# Patient Record
Sex: MALE | Race: WHITE | NOT HISPANIC OR LATINO | Employment: OTHER | URBAN - METROPOLITAN AREA
[De-identification: names, ages, dates, MRNs, and addresses within clinical notes are randomized per-mention and may not be internally consistent; named-entity substitution may affect disease eponyms.]

---

## 2017-01-06 ENCOUNTER — TRANSFERRED RECORDS (OUTPATIENT)
Dept: HEALTH INFORMATION MANAGEMENT | Facility: CLINIC | Age: 37
End: 2017-01-06

## 2017-03-22 ENCOUNTER — TRANSFERRED RECORDS (OUTPATIENT)
Dept: HEALTH INFORMATION MANAGEMENT | Facility: CLINIC | Age: 37
End: 2017-03-22

## 2017-05-09 ENCOUNTER — OFFICE VISIT (OUTPATIENT)
Dept: PEDIATRICS | Facility: CLINIC | Age: 37
End: 2017-05-09
Payer: COMMERCIAL

## 2017-05-09 VITALS
OXYGEN SATURATION: 96 % | WEIGHT: 301.9 LBS | TEMPERATURE: 97.8 F | DIASTOLIC BLOOD PRESSURE: 90 MMHG | BODY MASS INDEX: 40.95 KG/M2 | HEART RATE: 94 BPM | SYSTOLIC BLOOD PRESSURE: 128 MMHG

## 2017-05-09 DIAGNOSIS — B35.6 TINEA CRURIS: Primary | ICD-10-CM

## 2017-05-09 PROCEDURE — 99213 OFFICE O/P EST LOW 20 MIN: CPT | Performed by: INTERNAL MEDICINE

## 2017-05-09 RX ORDER — TERBINAFINE HYDROCHLORIDE 250 MG/1
250 TABLET ORAL DAILY
Qty: 30 TABLET | Refills: 0 | Status: SHIPPED | OUTPATIENT
Start: 2017-05-09 | End: 2018-10-26

## 2017-05-09 NOTE — PROGRESS NOTES
SUBJECTIVE:                                                    Hayden Nelson is a 36 year old male who presents to clinic today for the following health issues:      Rash      Duration: 1 month, comes and goes.     Description  Location: groin, bilateral  Itching: moderate to severe    Intensity:  mild    Accompanying signs and symptoms: itching at times    History (similar episodes/previous evaluation): Thinks he had something like this in 2016    Precipitating or alleviating factors:  New exposures:  Soaps switched soaps but switched back right away.   Recent travel: YES- Cruise to [place] and [facility].      Therapies tried and outcome: none       Problem list and histories reviewed & adjusted, as indicated.      OBJECTIVE:                                                    /90 (Cuff Size: Adult Large)  Pulse 94  Temp 97.8  F (36.6  C) (Oral)  Wt (!) 301 lb 14.4 oz (136.9 kg)  SpO2 96%  BMI 40.95 kg/m2  Body mass index is 40.95 kg/(m^2).  GEN: No distress  : Normal male. No testicular lesions, tenderness, skin lesions, discharge or inguinal hernias palpated.   SKIN: large plaques in the right and left groin extending down the medial thigh, leading edge with 3-4 cm of hyperkeratosis. Hyperkeratotic plaque in the gluteal cleft. Groin fold w/ depigmented skin.      ASSESSMENT/PLAN:                                                        ICD-10-CM    1. Tinea cruris B35.6 terbinafine (LAMISIL) 250 MG tablet     Severe tinea with a very large area affected. Not easily amenable to topical therapy.     Patient Instructions   Lamisil (terbinafine) 250 mg once per day for 1 month    Hydrocortisone cream if needed for itching    If your symptoms do not improve over the next 3-4 weeks please call    I recommend scheduling a physical sometime this month      Artemio Deleon MD  [facility]

## 2017-05-09 NOTE — PATIENT INSTRUCTIONS
Lamisil (terbinafine) 250 mg once per day for 1 month    Hydrocortisone cream if needed for itching    If your symptoms do not improve over the next 3-4 weeks please call    I recommend scheduling a physical sometime this month

## 2017-05-09 NOTE — NURSING NOTE
Chief Complaint   Patient presents with     Derm Problem       Initial /90 (Cuff Size: Adult Large)  Pulse 94  Temp 97.8  F (36.6  C) (Oral)  Wt (!) 301 lb 14.4 oz (136.9 kg)  SpO2 96%  BMI 40.95 kg/m2 Estimated body mass index is 40.95 kg/(m^2) as calculated from the following:    Height as of 5/27/16: 6' (1.829 m).    Weight as of this encounter: 301 lb 14.4 oz (136.9 kg).  Medication Reconciliation: complete    Kimmie Díaz

## 2017-05-09 NOTE — MR AVS SNAPSHOT
After Visit Summary   5/9/2017    Hayden Nelson    MRN: [id number]           Patient Information     Date Of Birth          1980        Visit Information        Provider Department      5/9/2017 4:20 PM Artemio Deleon MD [facility]        Today's Diagnoses     Tinea cruris    -  1      Care Instructions    Lamisil (terbinafine) 250 mg once per day for 1 month    Hydrocortisone cream if needed for itching    If your symptoms do not improve over the next 3-4 weeks please call    I recommend scheduling a physical sometime this month         Follow-ups after your visit        Who to contact     If you have questions or need follow up information about today's clinic visit or your schedule please contact [facility] directly at [phone].  Normal or non-critical lab and imaging results will be communicated to you by MyChart, letter or phone within 4 business days after the clinic has received the results. If you do not hear from us within 7 days, please contact the clinic through MyChart or phone. If you have a critical or abnormal lab result, we will notify you by phone as soon as possible.  Submit refill requests through [email or web address] or call your pharmacy and they will forward the refill request to us. Please allow 3 business days for your refill to be completed.          Additional Information About Your Visit        MyChart Information     [email or web address] gives you secure access to your electronic health record. If you see a primary care provider, you can also send messages to your care team and make appointments. If you have questions, please call your primary care clinic.  If you do not have a primary care provider, please call [phone] and they will assist you.        Care EveryWhere ID     This is your Care EveryWhere ID. This could be used by other organizations to access your [place] medical records  [id number]        Your Vitals Were     Pulse Temperature Pulse  Oximetry BMI (Body Mass Index)          94 97.8  F (36.6  C) (Oral) 96% 40.95 kg/m2         Blood Pressure from Last 3 Encounters:   05/09/17 128/90   06/11/16 140/70   05/27/16 120/76    Weight from Last 3 Encounters:   05/09/17 (!) 301 lb 14.4 oz (136.9 kg)   06/11/16 (!) 302 lb (137 kg)   05/27/16 (!) 306 lb (138.8 kg)              Today, you had the following     No orders found for display         Today's Medication Changes          These changes are accurate as of: 5/9/17  4:40 PM.  If you have any questions, ask your nurse or doctor.               Start taking these medicines.        Dose/Directions    terbinafine 250 MG tablet   Commonly known as:  lamISIL   Used for:  Tinea cruris   Started by:  Artemio Deleon MD        Dose:  250 mg   Take 1 tablet (250 mg) by mouth daily   Quantity:  30 tablet   Refills:  0            Where to get your medicines      These medications were sent to [place] [address] [place] [facility] [place] [address] [place] [address]     Phone:  [phone]     terbinafine 250 MG tablet                Primary Care Provider Office Phone # Fax #    Artemio Deleon [phone]       [address] [place] [address]        Thank you!     Thank you for choosing [facility]  for your care. Our goal is always to provide you with excellent care. Hearing back from our patients is one way we can continue to improve our services. Please take a few minutes to complete the written survey that you may receive in the mail after your visit with us. Thank you!             Your Updated Medication List - Protect others around you: Learn how to safely use, store and throw away your medicines at www.disposemymeds.org.          This list is accurate as of: 5/9/17  4:40 PM.  Always use your most recent med list.                   Brand Name Dispense Instructions for use    ALLEGRA PO      Reported on 5/9/2017       Multi-vitamin Tabs  tablet      Take 1 tablet by mouth daily Reported on 5/9/2017       order for DME     1 Box    Equipment being ordered: Wrist splint       penicillin V potassium 500 MG tablet    VEETID    40 tablet    Take 1 tablet (500 mg) by mouth 4 times daily       terbinafine 250 MG tablet    lamISIL    30 tablet    Take 1 tablet (250 mg) by mouth daily

## 2017-06-06 ENCOUNTER — OFFICE VISIT (OUTPATIENT)
Dept: PEDIATRICS | Facility: CLINIC | Age: 37
End: 2017-06-06
Payer: COMMERCIAL

## 2017-06-06 VITALS
OXYGEN SATURATION: 96 % | DIASTOLIC BLOOD PRESSURE: 70 MMHG | HEIGHT: 71 IN | BODY MASS INDEX: 41.72 KG/M2 | WEIGHT: 298 LBS | SYSTOLIC BLOOD PRESSURE: 120 MMHG | HEART RATE: 92 BPM | TEMPERATURE: 98 F

## 2017-06-06 DIAGNOSIS — E66.01 MORBID OBESITY DUE TO EXCESS CALORIES (H): ICD-10-CM

## 2017-06-06 DIAGNOSIS — Z23 NEED FOR VACCINATION: ICD-10-CM

## 2017-06-06 DIAGNOSIS — Z00.00 ROUTINE GENERAL MEDICAL EXAMINATION AT A HEALTH CARE FACILITY: Primary | ICD-10-CM

## 2017-06-06 DIAGNOSIS — L30.9 DERMATITIS: ICD-10-CM

## 2017-06-06 PROCEDURE — 90471 IMMUNIZATION ADMIN: CPT | Performed by: INTERNAL MEDICINE

## 2017-06-06 PROCEDURE — 36415 COLL VENOUS BLD VENIPUNCTURE: CPT | Performed by: INTERNAL MEDICINE

## 2017-06-06 PROCEDURE — 90715 TDAP VACCINE 7 YRS/> IM: CPT | Performed by: INTERNAL MEDICINE

## 2017-06-06 PROCEDURE — 99395 PREV VISIT EST AGE 18-39: CPT | Mod: 25 | Performed by: INTERNAL MEDICINE

## 2017-06-06 PROCEDURE — 80053 COMPREHEN METABOLIC PANEL: CPT | Performed by: INTERNAL MEDICINE

## 2017-06-06 PROCEDURE — 80061 LIPID PANEL: CPT | Performed by: INTERNAL MEDICINE

## 2017-06-06 RX ORDER — TRIAMCINOLONE ACETONIDE 1 MG/G
CREAM TOPICAL
Qty: 30 G | Refills: 0 | Status: SHIPPED | OUTPATIENT
Start: 2017-06-06 | End: 2018-10-26

## 2017-06-06 NOTE — MR AVS SNAPSHOT
After Visit Summary   6/6/2017    Hayden Nelson    MRN: [id number]           Patient Information     Date Of Birth          1980        Visit Information        Provider Department      6/6/2017 3:40 PM Artemio Deleon MD [facility]        Today's Diagnoses     Routine general medical examination at a health care facility    -  1    Dermatitis        Need for vaccination          Care Instructions      Preventive Health Recommendations    Yearly exam:             See your health care provider every year in order to  o   Review health changes.   o   Discuss preventive care.    o   Review your medicines.    Check your cholesterol and a diabetes test (fasting glucose) today.  Shots: Get a flu shot each year. Get a tetanus shot every 10 years.     Nutrition:    Eat at least 5 servings of fruits and vegetables daily.     Eat whole-grain bread, whole-wheat pasta and brown rice instead of white grains and rice.     Get adequate Calcium and Vitamin D.     Lifestyle    Exercise for at least 150 minutes a week (30 minutes a day, 5 days a week). This will help you control your weight and prevent disease.     Limit alcohol to one drink per day.     No smoking.     Wear sunscreen to prevent skin cancer.     See your dentist every six months for an exam and cleaning.             Follow-ups after your visit        Who to contact     If you have questions or need follow up information about today's clinic visit or your schedule please contact [facility] directly at [phone].  Normal or non-critical lab and imaging results will be communicated to you by MyChart, letter or phone within 4 business days after the clinic has received the results. If you do not hear from us within 7 days, please contact the clinic through MyChart or phone. If you have a critical or abnormal lab result, we will notify you by phone as soon as possible.  Submit refill requests through [employer] or call your  "pharmacy and they will forward the refill request to us. Please allow 3 business days for your refill to be completed.          Additional Information About Your Visit        [facility] [employer] Information     [employer] gives you secure access to your electronic health record. If you see a primary care provider, you can also send messages to your care team and make appointments. If you have questions, please call your primary care clinic.  If you do not have a primary care provider, please call [phone] and they will assist you.        Care EveryWhere ID     This is your Care EveryWhere ID. This could be used by other organizations to access your [place] medical records  [id number]        Your Vitals Were     Pulse Temperature Height Pulse Oximetry BMI (Body Mass Index)       92 98  F (36.7  C) (Oral) 5' 10.5\" (1.791 m) 96% 42.15 kg/m2        Blood Pressure from Last 3 Encounters:   06/06/17 120/70   05/09/17 128/90   06/11/16 140/70    Weight from Last 3 Encounters:   06/06/17 298 lb (135.2 kg)   05/09/17 (!) 301 lb 14.4 oz (136.9 kg)   06/11/16 (!) 302 lb (137 kg)              We Performed the Following     Comprehensive metabolic panel     Lipid panel reflex to direct LDL     TDAP VACCINE (ADACEL)          Today's Medication Changes          These changes are accurate as of: 6/6/17  4:19 PM.  If you have any questions, ask your nurse or doctor.               Start taking these medicines.        Dose/Directions    triamcinolone 0.1 % cream   Commonly known as:  KENALOG   Used for:  Dermatitis   Started by:  Artemio Deleon MD        Apply sparingly to affected area two times daily for 14 days.   Quantity:  30 g   Refills:  0            Where to get your medicines      These medications were sent to [place] [address] [place], [place] [address] [facility]  [address] [facility] [address]     Phone:  [phone]     triamcinolone 0.1 % cream                Primary Care Provider Office Phone # Fax #    Artemio Deleon, " [phone]       [facility] [address] [place] [address]        Thank you!     Thank you for choosing [facility]  for your care. Our goal is always to provide you with excellent care. Hearing back from our patients is one way we can continue to improve our services. Please take a few minutes to complete the written survey that you may receive in the mail after your visit with us. Thank you!             Your Updated Medication List - Protect others around you: Learn how to safely use, store and throw away your medicines at www.disposemymeds.org.          This list is accurate as of: 6/6/17  4:19 PM.  Always use your most recent med list.                   Brand Name Dispense Instructions for use    ALLEGRA PO      Reported on 5/9/2017       Multi-vitamin Tabs tablet      Take 1 tablet by mouth daily Reported on 5/9/2017       terbinafine 250 MG tablet    lamISIL    30 tablet    Take 1 tablet (250 mg) by mouth daily       triamcinolone 0.1 % cream    KENALOG    30 g    Apply sparingly to affected area two times daily for 14 days.

## 2017-06-06 NOTE — PROGRESS NOTES
SUBJECTIVE:     CC: Hayden Nelson is an 36 year old male who presents for preventative health visit.     Physical   Annual:     Getting at least 3 servings of Calcium per day::  NO    Bi-annual eye exam::  NO    Dental care twice a year::  Yes    Sleep apnea or symptoms of sleep apnea::  Sleep apnea    Diet::  Regular (no restrictions)    Frequency of exercise::  1 day/week    Duration of exercise::  15-30 minutes    Taking medications regularly::  Yes    Medication side effects::  None    Additional concerns today::  No      Derm changes on the right inner heel. Ongoing x 2 months. Pruritic. Has not tried topical meds.    Groin tinea. Nearing the end of 30 day Lamisil. Has noted improvement in odor and somewhat in color.     Obesity. Is working on diet - cut out most of his 6-8 regular sodas per day, now 1-2. Has started exercising - walking several days per week.  Wt Readings from Last 2 Encounters:   06/06/17 298 lb (135.2 kg)   05/09/17 (!) 301 lb 14.4 oz (136.9 kg)       Today's PHQ-2 Score:   PHQ-2 ( 1999 [employer]) 6/6/2017   Q1: Little interest or pleasure in doing things 1   Q2: Feeling down, depressed or hopeless 1   PHQ-2 Score 2   Q1: Little interest or pleasure in doing things Several days   Q2: Feeling down, depressed or hopeless Several days   PHQ-2 Score 2       Abuse: Current or Past(Physical, Sexual or Emotional)- No  Do you feel safe in your environment - Yes    Social History   Substance Use Topics     Smoking status: Current Every Day Smoker     Packs/day: 1.50     Years: 14.00     Types: Cigarettes     Smokeless tobacco: Never Used     Alcohol use Yes      Comment: 2 beer per year     The patient does not drink >3 drinks per day nor >7 drinks per week.    Recent Labs   Lab Test  03/21/12   1002  01/18/11   0900   CHOL  191  222*   HDL  31*  37*   LDL  110  137*   TRIG  245*  242*   CHOLHDLRATIO  6.1*  6.1*       Reviewed orders with patient. Reviewed health maintenance and updated orders  "accordingly - Yes    Reviewed and updated as needed this visit by clinical staff  Tobacco  Allergies  Meds  Med Hx  Surg Hx  Fam Hx  Soc Hx        Reviewed and updated as needed this visit by Provider            ROS:  C: NEGATIVE for fever, chills, change in weight  INTEGUMENTARY/SKIN: Per HPI   E: NEGATIVE for vision changes or irritation  ENT: NEGATIVE for ear, mouth and throat problems  R: NEGATIVE for significant cough or SOB  CV: NEGATIVE for chest pain, palpitations or peripheral edema  GI: NEGATIVE for nausea, abdominal pain, heartburn, or change in bowel habits   male: negative for dysuria, hematuria, decreased urinary stream, erectile dysfunction, urethral discharge  M: NEGATIVE for significant arthralgias or myalgia  N: NEGATIVE for weakness, dizziness or paresthesias  P: NEGATIVE for changes in mood or affect    Problem list, Medication list, Allergies, and Medical/Social/Surgical histories reviewed in [facility] and updated as appropriate.  Labs reviewed in EPIC  OBJECTIVE:     /70 (Cuff Size: Adult Large)  Pulse 92  Temp 98  F (36.7  C) (Oral)  Ht 5' 10.5\" (1.791 m)  Wt 298 lb (135.2 kg)  SpO2 96%  BMI 42.15 kg/m2  EXAM:  GENERAL: healthy, alert and no distress  EYES: Eyes grossly normal to inspection, PERRL and conjunctivae and sclerae normal  HENT: ear canals and TM's normal, nose and mouth without ulcers or lesions  NECK: no adenopathy, no asymmetry, masses, or scars and thyroid normal to palpation  RESP: lungs clear to auscultation - no rales, rhonchi or wheezes  CV: regular rate and rhythm, normal S1 S2, no S3 or S4, no murmur, click or rub, no peripheral edema and peripheral pulses strong  ABDOMEN: soft, nontender, no hepatosplenomegaly, no masses and bowel sounds normal  : normal male genitalia without testicular lesions or urethral discharge, no hernia  MS: no gross musculoskeletal defects noted, no edema  SKIN: light pink plaques in both groin folds. More dull than at the time " "of his last OV. Heel with 2 cm patch of hyperkeratosis and xerosis.   NEURO: Normal strength and tone, mentation intact and speech normal  PSYCH: mentation appears normal, affect normal/bright    ASSESSMENT/PLAN:         ICD-10-CM    1. Routine general medical examination at a health care facility Z00.00 Lipid panel reflex to direct LDL     Comprehensive metabolic panel   2. Dermatitis L30.9 triamcinolone (KENALOG) 0.1 % cream   3. Morbid obesity due to excess calories (H) E66.01    4. Need for vaccination Z23 TDAP VACCINE (ADACEL)     Heel dermatitis- likely eczema. Triamcinolone.  Groin tinea - clinically appears improved. Still w/ post-inflammatory changes, expect this to improve somewhat over the next few months.     COUNSELING:   Reviewed preventive health counseling, as reflected in patient instructions         reports that he has been smoking Cigarettes.  He has a 21.00 pack-year smoking history. He has never used smokeless tobacco.  Tobacco Cessation Action Plan: Information offered: Patient not interested at this time  Estimated body mass index is 42.15 kg/(m^2) as calculated from the following:    Height as of this encounter: 5' 10.5\" (1.791 m).    Weight as of this encounter: 298 lb (135.2 kg).   Weight management plan: Discussed healthy diet and exercise guidelines and patient will follow up in 12 months in clinic to re-evaluate.      Artemio Deleon MD  [facility] [place]  "

## 2017-06-06 NOTE — PATIENT INSTRUCTIONS
Preventive Health Recommendations    Yearly exam:             See your health care provider every year in order to  o   Review health changes.   o   Discuss preventive care.    o   Review your medicines.    Check your cholesterol and a diabetes test (fasting glucose) today.  Shots: Get a flu shot each year. Get a tetanus shot every 10 years.     Nutrition:    Eat at least 5 servings of fruits and vegetables daily.     Eat whole-grain bread, whole-wheat pasta and brown rice instead of white grains and rice.     Get adequate Calcium and Vitamin D.     Lifestyle    Exercise for at least 150 minutes a week (30 minutes a day, 5 days a week). This will help you control your weight and prevent disease.     Limit alcohol to one drink per day.     No smoking.     Wear sunscreen to prevent skin cancer.     See your dentist every six months for an exam and cleaning.

## 2017-06-06 NOTE — NURSING NOTE
"Chief Complaint   Patient presents with     Physical       Initial /70 (Cuff Size: Adult Large)  Pulse 92  Temp 98  F (36.7  C) (Oral)  Ht 5' 10.5\" (1.791 m)  Wt 298 lb (135.2 kg)  SpO2 96%  BMI 42.15 kg/m2 Estimated body mass index is 42.15 kg/(m^2) as calculated from the following:    Height as of this encounter: 5' 10.5\" (1.791 m).    Weight as of this encounter: 298 lb (135.2 kg).  Medication Reconciliation: complete    Kimmie Díaz   "

## 2017-06-07 LAB
ALBUMIN SERPL-MCNC: 4.3 G/DL (ref 3.4–5)
ALP SERPL-CCNC: 65 U/L (ref 40–150)
ALT SERPL W P-5'-P-CCNC: 38 U/L (ref 0–70)
ANION GAP SERPL CALCULATED.3IONS-SCNC: 8 MMOL/L (ref 3–14)
AST SERPL W P-5'-P-CCNC: 21 U/L (ref 0–45)
BILIRUB SERPL-MCNC: 0.6 MG/DL (ref 0.2–1.3)
BUN SERPL-MCNC: 9 MG/DL (ref 7–30)
CALCIUM SERPL-MCNC: 9.3 MG/DL (ref 8.5–10.1)
CHLORIDE SERPL-SCNC: 112 MMOL/L (ref 94–109)
CHOLEST SERPL-MCNC: 200 MG/DL
CO2 SERPL-SCNC: 25 MMOL/L (ref 20–32)
CREAT SERPL-MCNC: 0.91 MG/DL (ref 0.66–1.25)
GFR SERPL CREATININE-BSD FRML MDRD: ABNORMAL ML/MIN/1.7M2
GLUCOSE SERPL-MCNC: 84 MG/DL (ref 70–99)
HDLC SERPL-MCNC: 29 MG/DL
LDLC SERPL CALC-MCNC: 123 MG/DL
NONHDLC SERPL-MCNC: 171 MG/DL
POTASSIUM SERPL-SCNC: 4.4 MMOL/L (ref 3.4–5.3)
PROT SERPL-MCNC: 7.3 G/DL (ref 6.8–8.8)
SODIUM SERPL-SCNC: 145 MMOL/L (ref 133–144)
TRIGL SERPL-MCNC: 240 MG/DL

## 2017-08-04 ENCOUNTER — TRANSFERRED RECORDS (OUTPATIENT)
Dept: HEALTH INFORMATION MANAGEMENT | Facility: CLINIC | Age: 37
End: 2017-08-04

## 2017-09-02 ENCOUNTER — TRANSFERRED RECORDS (OUTPATIENT)
Dept: HEALTH INFORMATION MANAGEMENT | Facility: CLINIC | Age: 37
End: 2017-09-02

## 2017-09-20 ENCOUNTER — OFFICE VISIT (OUTPATIENT)
Dept: URGENT CARE | Facility: URGENT CARE | Age: 37
End: 2017-09-20
Payer: COMMERCIAL

## 2017-09-20 VITALS
HEART RATE: 103 BPM | OXYGEN SATURATION: 96 % | WEIGHT: 296.8 LBS | BODY MASS INDEX: 41.98 KG/M2 | SYSTOLIC BLOOD PRESSURE: 114 MMHG | TEMPERATURE: 99.3 F | DIASTOLIC BLOOD PRESSURE: 78 MMHG

## 2017-09-20 DIAGNOSIS — R30.0 DYSURIA: ICD-10-CM

## 2017-09-20 DIAGNOSIS — R82.90 NONSPECIFIC FINDING ON EXAMINATION OF URINE: ICD-10-CM

## 2017-09-20 DIAGNOSIS — N10 ACUTE PYELONEPHRITIS: Primary | ICD-10-CM

## 2017-09-20 LAB
ALBUMIN UR-MCNC: 30 MG/DL
APPEARANCE UR: CLEAR
BACTERIA #/AREA URNS HPF: ABNORMAL /HPF
BILIRUB UR QL STRIP: NEGATIVE
COLOR UR AUTO: YELLOW
GLUCOSE UR STRIP-MCNC: NEGATIVE MG/DL
HGB UR QL STRIP: ABNORMAL
KETONES UR STRIP-MCNC: NEGATIVE MG/DL
LEUKOCYTE ESTERASE UR QL STRIP: ABNORMAL
NITRATE UR QL: POSITIVE
NON-SQ EPI CELLS #/AREA URNS LPF: ABNORMAL /LPF
PH UR STRIP: 6.5 PH (ref 5–7)
RBC #/AREA URNS AUTO: ABNORMAL /HPF
SOURCE: ABNORMAL
SP GR UR STRIP: 1.02 (ref 1–1.03)
UROBILINOGEN UR STRIP-ACNC: 0.2 EU/DL (ref 0.2–1)
WBC #/AREA URNS AUTO: ABNORMAL /HPF

## 2017-09-20 PROCEDURE — 87086 URINE CULTURE/COLONY COUNT: CPT | Performed by: FAMILY MEDICINE

## 2017-09-20 PROCEDURE — 87186 SC STD MICRODIL/AGAR DIL: CPT | Performed by: FAMILY MEDICINE

## 2017-09-20 PROCEDURE — 81001 URINALYSIS AUTO W/SCOPE: CPT | Performed by: FAMILY MEDICINE

## 2017-09-20 PROCEDURE — 96372 THER/PROPH/DIAG INJ SC/IM: CPT | Performed by: FAMILY MEDICINE

## 2017-09-20 PROCEDURE — 99214 OFFICE O/P EST MOD 30 MIN: CPT | Mod: 25 | Performed by: FAMILY MEDICINE

## 2017-09-20 PROCEDURE — 87088 URINE BACTERIA CULTURE: CPT | Performed by: FAMILY MEDICINE

## 2017-09-20 RX ORDER — CEFTRIAXONE 1 G/1
1000 INJECTION, POWDER, FOR SOLUTION INTRAMUSCULAR; INTRAVENOUS ONCE
Qty: 10 ML | Refills: 0 | OUTPATIENT
Start: 2017-09-20 | End: 2017-09-20

## 2017-09-20 RX ORDER — SULFAMETHOXAZOLE/TRIMETHOPRIM 800-160 MG
1 TABLET ORAL 2 TIMES DAILY
Qty: 14 TABLET | Refills: 0 | Status: SHIPPED | OUTPATIENT
Start: 2017-09-20 | End: 2018-10-26

## 2017-09-20 NOTE — PATIENT INSTRUCTIONS
Take full course of antibiotic.  Drink lots of water.      Pyelonephritis or Kidney Infection (Adult, Male)  An infection of one or both kidneys is called pyelonephritis. It usually happens when bacteria (or rarely, viruses, fungi, or other disease-causing organisms) get into the kidneys. The bacteria (or other disease-causing organisms) can enter the kidneys from the bladder or blood traveling from other parts of the body to cause pyelonephritis. A kidney infection can become serious. It can cause severe illness, scarring of the kidneys, or kidney failure if not treated properly.     Common causes include:    Not keeping the genital area clean and dry, which promotes the growth of bacteria    Wearing tight pants or underwear, which allows moisture to build up in the genital area, helping bacteria grow    Holding urine for long periods of time    Dehydration  Kidney infections can cause symptoms similar to bladder infections. The infection can cause one or more of these symptoms:    Pain or burning when urinating    Having to urinate more often than usual    Blood in urine (pink or red)    Belly pain or discomfort, usually in the lower abdomen    Pain in the side or back    Pain above the pubic bone    Fever or chills    Vomiting    Loss of appetite  Treatment is oral antibiotics, or in more severe cases, intramuscular or intravenous (IV) antibiotics. These are started right away. Treatment helps prevent a more serious kidney infection.  Medicines  Medicines can help in the treatment of kidney infection:    Take antibiotics until they are used up, even if you feel better. It is important to finish them to make sure the infection is gone.    Unless another medicine was given, you can use over-the-counter medicines for pain, fever, or discomfort. If you have chronic liver or kidney disease, talk with your healthcare provider before taking these medicines. Also tell your provider if you've ever had a stomach ulcer of  gastrointestinal (GI) bleeding, or are taking blood thinners.  Home care  The following guidelines can help you care for yourself at home:    Stay home from work or school. Rest in bed until your fever breaks and you are feeling better, or as advised by your healthcare provider.    Drink lots of fluid unless you must restrict fluids for other medical reasons. This will force the medicine into your urinary system and help flush the bacteria out of your body. Ask your healthcare provider how much you should drink.    Avoid sex until you have finished all of your medicine and your symptoms are gone.    Avoid caffeine, alcohol, and spicy foods. These foods may irritate the kidneys and bladder.    Wear loose-fitting clothes and cotton underwear.  Prevention  These self-care steps can help prevent future infections:    Drink plenty of fluids to prevent dehydration and flush out the bladder. Do this unless you must restrict your fluids for other health reasons, or your healthcare provider told you not to.    Proper cleaning after going to the bathroom is important.    Clean your penis regularly. If you aren't circumcised, pull back the foreskin when cleaning.    Urinate more often. Don't try to hold urine in for a long time.    Avoid tight-fitting pants and underwear.    Improve your diet and prevent constipation. Eat more fresh fruit, vegetables, and fiber. Eat less junk and fatty foods. Constipation can increase your chance of getting a urinary tract infection. Talk with your healthcare provider if you have trouble with bowel movements.    Urinate right after sex to flush out the bladder.  Follow-up care  Follow up with your healthcare provider, or as advised. Additional testing may be needed to make sure the infection is clearing. Close follow-up and further testing is very important to find the cause and to prevent future infections.  If a urine culture was done, you will be contacted if your treatment needs to be  changed. If directed, you can call to find out the results.  If you had an X-ray, CT scan, or another diagnostic test, you will be notified of any new findings that may affect your care.  Call 911  Call 911 if any of the following occur:    Trouble breathing    Fainting or loss of consciousness    Rapid or very slow heart rate    Weakness, dizziness, or fainting    Difficulty arousing or confusion  When to seek medical advice  Call your healthcare provider right away if any of the following occur:    Fever of 100.4  F (38  C) or higher after 48 hours of treatment, or as directed by your healthcare provider    Not feeling better within 1 to 2 days after starting antibiotics    Any symptom that continues after 3 days of treatment    Increasing pain in the stomach, back, side, or groin area    Repeated vomiting    Not able to take prescribed medicine due to nausea or another reason    Bloody, dark-colored, or foul smelling urine    Trouble urinating or decreased urine output    No urine for 8 hours, no tears when crying, sunken eyes, or dry mouth  Date Last Reviewed: 10/1/2016    9172-7443 [employer]. [address] [place] [address], [place] [address]. All rights reserved. This information is not intended as a substitute for professional medical care. Always follow your healthcare professional's instructions.

## 2017-09-20 NOTE — MR AVS SNAPSHOT
After Visit Summary   9/20/2017    Hayden Nelson    MRN: [id number]           Patient Information     Date Of Birth          1980        Visit Information        Provider Department      9/20/2017 11:10 AM Donaldo Landaverde MD [facility]        Today's Diagnoses     Dysuria    -  1    Nonspecific finding on examination of urine        Acute pyelonephritis          Care Instructions    Take full course of antibiotic.  Drink lots of water.      Pyelonephritis or Kidney Infection (Adult, Male)  An infection of one or both kidneys is called pyelonephritis. It usually happens when bacteria (or rarely, viruses, fungi, or other disease-causing organisms) get into the kidneys. The bacteria (or other disease-causing organisms) can enter the kidneys from the bladder or blood traveling from other parts of the body to cause pyelonephritis. A kidney infection can become serious. It can cause severe illness, scarring of the kidneys, or kidney failure if not treated properly.     Common causes include:    Not keeping the genital area clean and dry, which promotes the growth of bacteria    Wearing tight pants or underwear, which allows moisture to build up in the genital area, helping bacteria grow    Holding urine for long periods of time    Dehydration  Kidney infections can cause symptoms similar to bladder infections. The infection can cause one or more of these symptoms:    Pain or burning when urinating    Having to urinate more often than usual    Blood in urine (pink or red)    Belly pain or discomfort, usually in the lower abdomen    Pain in the side or back    Pain above the pubic bone    Fever or chills    Vomiting    Loss of appetite  Treatment is oral antibiotics, or in more severe cases, intramuscular or intravenous (IV) antibiotics. These are started right away. Treatment helps prevent a more serious kidney infection.  Medicines  Medicines can help in the treatment of kidney  infection:    Take antibiotics until they are used up, even if you feel better. It is important to finish them to make sure the infection is gone.    Unless another medicine was given, you can use over-the-counter medicines for pain, fever, or discomfort. If you have chronic liver or kidney disease, talk with your healthcare provider before taking these medicines. Also tell your provider if you've ever had a stomach ulcer of gastrointestinal (GI) bleeding, or are taking blood thinners.  Home care  The following guidelines can help you care for yourself at home:    Stay home from work or school. Rest in bed until your fever breaks and you are feeling better, or as advised by your healthcare provider.    Drink lots of fluid unless you must restrict fluids for other medical reasons. This will force the medicine into your urinary system and help flush the bacteria out of your body. Ask your healthcare provider how much you should drink.    Avoid sex until you have finished all of your medicine and your symptoms are gone.    Avoid caffeine, alcohol, and spicy foods. These foods may irritate the kidneys and bladder.    Wear loose-fitting clothes and cotton underwear.  Prevention  These self-care steps can help prevent future infections:    Drink plenty of fluids to prevent dehydration and flush out the bladder. Do this unless you must restrict your fluids for other health reasons, or your healthcare provider told you not to.    Proper cleaning after going to the bathroom is important.    Clean your penis regularly. If you aren't circumcised, pull back the foreskin when cleaning.    Urinate more often. Don't try to hold urine in for a long time.    Avoid tight-fitting pants and underwear.    Improve your diet and prevent constipation. Eat more fresh fruit, vegetables, and fiber. Eat less junk and fatty foods. Constipation can increase your chance of getting a urinary tract infection. Talk with your healthcare provider if  you have trouble with bowel movements.    Urinate right after sex to flush out the bladder.  Follow-up care  Follow up with your healthcare provider, or as advised. Additional testing may be needed to make sure the infection is clearing. Close follow-up and further testing is very important to find the cause and to prevent future infections.  If a urine culture was done, you will be contacted if your treatment needs to be changed. If directed, you can call to find out the results.  If you had an X-ray, CT scan, or another diagnostic test, you will be notified of any new findings that may affect your care.  Call 911  Call 911 if any of the following occur:    Trouble breathing    Fainting or loss of consciousness    Rapid or very slow heart rate    Weakness, dizziness, or fainting    Difficulty arousing or confusion  When to seek medical advice  Call your healthcare provider right away if any of the following occur:    Fever of 100.4  F (38  C) or higher after 48 hours of treatment, or as directed by your healthcare provider    Not feeling better within 1 to 2 days after starting antibiotics    Any symptom that continues after 3 days of treatment    Increasing pain in the stomach, back, side, or groin area    Repeated vomiting    Not able to take prescribed medicine due to nausea or another reason    Bloody, dark-colored, or foul smelling urine    Trouble urinating or decreased urine output    No urine for 8 hours, no tears when crying, sunken eyes, or dry mouth  Date Last Reviewed: 10/1/2016    3545-1575 [employer]. [address] [place] [address], [place] [address]. All rights reserved. This information is not intended as a substitute for professional medical care. Always follow your healthcare professional's instructions.                Follow-ups after your visit        Who to contact     If you have questions or need follow up information about today's clinic visit or your schedule please contact [facility] directly at [phone].  Normal or non-critical lab and imaging results will be communicated to you by [email or web address], letter or phone within 4 business days after the clinic has received the results. If you do not hear from us within 7 days, please contact the clinic through [email or web address] or phone. If you have a critical or abnormal lab result, we will notify you by phone as soon as possible.  Submit refill requests through [employer] or call your pharmacy and they will forward the refill request to us. Please allow 3 business days for your refill to be completed.          Additional Information About Your Visit        [email or web address] Information     [employer] gives you secure access to your electronic health record. If you see a primary care provider, you can also send messages to your care team and make appointments. If you have questions, please call your primary care clinic.  If you do not have a primary care provider, please call [phone] and they will assist you.        Care EveryWhere ID     This is your Care EveryWhere ID. This could be used by other organizations to access your [facility] medical records  [id number]        Your Vitals Were     Pulse Temperature Pulse Oximetry BMI (Body Mass Index)          103 99.3  F (37.4  C) (Tympanic) 96% 41.98 kg/m2         Blood Pressure from Last 3 Encounters:   09/20/17 114/78   06/06/17 120/70   05/09/17 128/90    Weight from Last 3 Encounters:   09/20/17 296 lb 12.8 oz (134.6 kg)   06/06/17 298 lb (135.2 kg)   05/09/17 (!) 301 lb 14.4 oz (136.9 kg)              We Performed the Following     UA reflex to Microscopic and Culture     Urine Culture Aerobic Bacterial     Urine Microscopic          Today's Medication Changes          These changes are accurate as of: 9/20/17 11:58 AM.  If you have any questions, ask your nurse or doctor.               Start taking these medicines.        Dose/Directions    cefTRIAXone 1 GM vial   Commonly known as:  ROCEPHIN   Used for:   Acute pyelonephritis   Started by:  Donaldo Landaverde MD        Dose:  1000 mg   Inject 1 g (1,000 mg) into the muscle once for 1 dose   Quantity:  10 mL   Refills:  0       sulfamethoxazole-trimethoprim 800-160 MG per tablet   Commonly known as:  BACTRIM DS/SEPTRA DS   Used for:  Acute pyelonephritis   Started by:  Donaldo Landaverde MD        Dose:  1 tablet   Take 1 tablet by mouth 2 times daily   Quantity:  14 tablet   Refills:  0            Where to get your medicines      These medications were sent to [place] [id number] [place] [facility] [place] - [address]  [address] [facility] [place] [address]     Phone:  [phone]     sulfamethoxazole-trimethoprim 800-160 MG per tablet         Some of these will need a paper prescription and others can be bought over the counter.  Ask your nurse if you have questions.     You don't need a prescription for these medications     cefTRIAXone 1 GM vial                Primary Care Provider Office Phone # Fax #    Artemio Deleon [phone]       [facility] [place] [address]        Equal Access to Services     [facility] AH: Hadii jamel ku hadasho Soomaali, waaxda luqadaha, qaybta kaalmada adeegyada, ramses steinberg hayseymour lopez . So [facility] [phone].    ATENCIÓN: Si habla español, tiene a kitchen disposición servicios gratuitos de asistencia lingüística. Llame al [phone].    We comply with applicable federal civil rights laws and [place] laws. We do not discriminate on the basis of race, color, national origin, age, disability sex, sexual orientation or gender identity.            Thank you!     Thank you for choosing [facility]  for your care. Our goal is always to provide you with excellent care. Hearing back from our patients is one way we can continue to improve our services. Please take a few minutes to complete the written survey that you may receive in the mail after your visit with us. Thank you!             Your  Updated Medication List - Protect others around you: Learn how to safely use, store and throw away your medicines at www.disposemymeds.org.          This list is accurate as of: 9/20/17 11:58 AM.  Always use your most recent med list.                   Brand Name Dispense Instructions for use Diagnosis    ALLEGRA PO      Reported on 5/9/2017        cefTRIAXone 1 GM vial    ROCEPHIN    10 mL    Inject 1 g (1,000 mg) into the muscle once for 1 dose    Acute pyelonephritis       Multi-vitamin Tabs tablet      Take 1 tablet by mouth daily Reported on 5/9/2017        sulfamethoxazole-trimethoprim 800-160 MG per tablet    BACTRIM DS/SEPTRA DS    14 tablet    Take 1 tablet by mouth 2 times daily    Acute pyelonephritis       terbinafine 250 MG tablet    lamISIL    30 tablet    Take 1 tablet (250 mg) by mouth daily    Tinea cruris       triamcinolone 0.1 % cream    KENALOG    30 g    Apply sparingly to affected area two times daily for 14 days.    Dermatitis

## 2017-09-20 NOTE — NURSING NOTE
"Chief Complaint   Patient presents with     Fever     fever x2 days tx:ibuprofen, tylenol. patient recently had tooth pulled     UTI     burning, frequency x3days        Initial /78 (BP Location: Right arm, Patient Position: Chair, Cuff Size: Adult Large)  Pulse 103  Temp 99.3  F (37.4  C) (Tympanic)  Wt 296 lb 12.8 oz (134.6 kg)  SpO2 96%  BMI 41.98 kg/m2 Estimated body mass index is 41.98 kg/(m^2) as calculated from the following:    Height as of 6/6/17: 5' 10.5\" (1.791 m).    Weight as of this encounter: 296 lb 12.8 oz (134.6 kg).  Medication Reconciliation: unable or not appropriate to perform   Sanjuanita Lo    The following medication was given:     MEDICATION: Rocephin 1000mg and Lidocaine 2.1cc  ROUTE: IM  SITE: Ventrogluteal - Left  DOSE: 1G  LOT #: [id number]  :  [employer]  EXPIRATION DATE:  01/30/2020  NDC#: 9855-3840-27  Sanjuanita Lo       "

## 2017-09-20 NOTE — PROGRESS NOTES
SUBJECTIVE:   Hayden Nelson is a 36 year old male who  presents today for a possible UTI. Symptoms of dysuria and frequency have been going on for 3 day(s).  Hematuria no.  sudden onset and worseningand moderate.  There is positive history of fever, chills.  Denies any nausea or vomiting.  Endorsed endorse back pain and body aches, no trauma.  No history of penile discharge, monogamous relationship and no concerns for STD. This patient does not have a history of urinary tract infections though recall having urine infection once before.     Patient states that had prior antibiotic for tooth abscess, did finally have this pulled out and pain has improved though still has residual soreness.    Denies any kidney stones, positive for family history of kidney stones.    Past Medical History:   Diagnosis Date     VIRAL WARTS NOS 12/24/2007     Current Outpatient Prescriptions   Medication Sig Dispense Refill     Fexofenadine HCl (ALLEGRA PO) Reported on 5/9/2017       multivitamin, therapeutic with minerals (MULTI-VITAMIN) TABS Take 1 tablet by mouth daily Reported on 5/9/2017       triamcinolone (KENALOG) 0.1 % cream Apply sparingly to affected area two times daily for 14 days. (Patient not taking: Reported on 9/20/2017) 30 g 0     terbinafine (LAMISIL) 250 MG tablet Take 1 tablet (250 mg) by mouth daily (Patient not taking: Reported on 9/20/2017) 30 tablet 0     Social History   Substance Use Topics     Smoking status: Current Every Day Smoker     Packs/day: 1.50     Years: 14.00     Types: Cigarettes     Smokeless tobacco: Never Used     Alcohol use Yes      Comment: 2 beer per year       ROS:   CONSTITUTIONAL:POSITIVE  for chills, fatigue, fever, malaise and myalgias  INTEGUMENTARY/SKIN: NEGATIVE for worrisome rashes, moles or lesions  ENT/MOUTH: NEGATIVE for ear, mouth and throat problems  RESP:NEGATIVE for significant cough or SOB  CV: NEGATIVE for chest pain, palpitations or peripheral edema  GI: NEGATIVE for  nausea, abdominal pain, heartburn, or change in bowel habits  : positive for dysuria and frequency  MUSCULOSKELETAL: NEGATIVE for significant arthralgias or myalgia and POSITIVE  for back pain     OBJECTIVE:  /78 (BP Location: Right arm, Patient Position: Chair, Cuff Size: Adult Large)  Pulse 103  Temp 99.3  F (37.4  C) (Tympanic)  Wt 296 lb 12.8 oz (134.6 kg)  SpO2 96%  BMI 41.98 kg/m2  GENERAL APPEARANCE: healthy, alert and no distress  RESP: lungs clear to auscultation - no rales, rhonchi or wheezes  CV: regular rates and rhythm, normal S1 S2, no murmur noted  ABDOMEN:  soft, nontender, no HSM or masses and bowel sounds normal  BACK: No CVA tenderness  SKIN: no suspicious lesions or rashes  PSYCH: mentation appears normal and affect normal/bright    Results for orders placed or performed in visit on 09/20/17   UA reflex to Microscopic and Culture   Result Value Ref Range    Color Urine Yellow     Appearance Urine Clear     Glucose Urine Negative NEG^Negative mg/dL    Bilirubin Urine Negative NEG^Negative    Ketones Urine Negative NEG^Negative mg/dL    Specific Gravity Urine 1.020 1.003 - 1.035    Blood Urine Moderate (A) NEG^Negative    pH Urine 6.5 5.0 - 7.0 pH    Protein Albumin Urine 30 (A) NEG^Negative mg/dL    Urobilinogen Urine 0.2 0.2 - 1.0 EU/dL    Nitrite Urine Positive (A) NEG^Negative    Leukocyte Esterase Urine Moderate (A) NEG^Negative    Source Midstream Urine    Urine Microscopic   Result Value Ref Range    WBC Urine 25-50 (A) OTO2^O - 2 /HPF    RBC Urine 25-50 (A) OTO2^O - 2 /HPF    Squamous Epithelial /LPF Urine Few FEW^Few /LPF    Bacteria Urine Many (A) NEG^Negative /HPF       ASSESSMENT/PLAN:   (N10) Acute pyelonephritis  (primary encounter diagnosis)  Plan: cefTRIAXone (ROCEPHIN) 1 GM vial,         sulfamethoxazole-trimethoprim (BACTRIM         DS/SEPTRA DS) 800-160 MG per tablet            (R30.0) Dysuria  Plan: UA reflex to Microscopic and Culture, Urine         Microscopic             (R82.90) Nonspecific finding on examination of urine  Plan: Urine Culture Aerobic Bacterial            Due to patient report of fever and back pain, will treat for early pyelonephritis.  Patient does not appear toxic and vitals stable.  Will follow up on urine culture and adjust medication if needed.  Ceftriaxone 1 gram IM X1 given in clinic, RX Bactrim DS given, encourage to take full course.  Drink plenty of fluids.  Prevention and treatment of UTI's discussed.Signs and symptoms of pyelonephritis mentioned.    Recommend to follow up with primary in 2 weeks for urine recheck.    Donaldo Landaverde MD  September 20, 2017 12:08 PM

## 2017-09-22 LAB
BACTERIA SPEC CULT: ABNORMAL
SPECIMEN SOURCE: ABNORMAL

## 2018-04-05 ENCOUNTER — TRANSFERRED RECORDS (OUTPATIENT)
Dept: HEALTH INFORMATION MANAGEMENT | Facility: CLINIC | Age: 38
End: 2018-04-05

## 2018-09-15 ENCOUNTER — TRANSFERRED RECORDS (OUTPATIENT)
Dept: HEALTH INFORMATION MANAGEMENT | Facility: CLINIC | Age: 38
End: 2018-09-15

## 2018-10-26 ENCOUNTER — OFFICE VISIT (OUTPATIENT)
Dept: PEDIATRICS | Facility: CLINIC | Age: 38
End: 2018-10-26
Payer: COMMERCIAL

## 2018-10-26 VITALS
RESPIRATION RATE: 15 BRPM | HEIGHT: 71 IN | HEART RATE: 88 BPM | DIASTOLIC BLOOD PRESSURE: 76 MMHG | WEIGHT: 313 LBS | OXYGEN SATURATION: 94 % | SYSTOLIC BLOOD PRESSURE: 110 MMHG | BODY MASS INDEX: 43.82 KG/M2 | TEMPERATURE: 97.1 F

## 2018-10-26 DIAGNOSIS — D22.9 SUSPICIOUS NEVUS: Primary | ICD-10-CM

## 2018-10-26 DIAGNOSIS — Z23 NEED FOR VACCINATION: ICD-10-CM

## 2018-10-26 PROCEDURE — 90471 IMMUNIZATION ADMIN: CPT | Performed by: INTERNAL MEDICINE

## 2018-10-26 PROCEDURE — 90686 IIV4 VACC NO PRSV 0.5 ML IM: CPT | Performed by: INTERNAL MEDICINE

## 2018-10-26 PROCEDURE — 99213 OFFICE O/P EST LOW 20 MIN: CPT | Mod: 25 | Performed by: INTERNAL MEDICINE

## 2018-10-26 NOTE — PROGRESS NOTES
"  SUBJECTIVE:   Hayden Nelson is a 37 year old male who presents to clinic today for the following health issues:    Growth Under Eye    Duration: 4-5 months    Description (location/character/radiation): side of left eye    Intensity:  mild    Accompanying signs and symptoms: redness, scratches a lot - CPAP machine goes over the top of it    History (similar episodes/previous evaluation): None    Precipitating or alleviating factors: None    Therapies tried and outcome: None       Problem list and histories reviewed & adjusted, as indicated.      OBJECTIVE:     /76 (BP Location: Right arm, Patient Position: Chair, Cuff Size: Adult Regular)  Pulse 88  Temp 97.1  F (36.2  C) (Tympanic)  Resp 15  Ht 5' 10.5\" (1.791 m)  Wt 313 lb (142 kg)  SpO2 94%  BMI 44.28 kg/m2  Body mass index is 44.28 kg/(m^2).  GEN: no distress  SKIN: clear nevus on the right upper lateral cheek, excoriated superior aspect. No bleeding noted. No erythema. No discharge. No other suspicious lesions.  HEENT: PERRL. No nasal discharge. OP moist.  NECK: supple    ASSESSMENT/PLAN:       ICD-10-CM    1. Suspicious nevus D22.9 DERMATOLOGY REFERRAL   2. Need for vaccination Z23 FLU VACCINE, SPLIT VIRUS, IM (QUADRIVALENT) [84325]- >3 YRS     Potentially scar tissue, but cannot r/o basal cell or AK.    Patient Instructions   Call to set up a dermatology visit     Artemio Deleon MD  [facility] [place]  "

## 2018-10-26 NOTE — MR AVS SNAPSHOT
After Visit Summary   10/26/2018    Hayden Nelson    MRN: [id number]           Patient Information     Date Of Birth          1980        Visit Information        Provider Department      10/26/2018 10:00 AM Artemio Deleon MD [facility] [place]        Today's Diagnoses     Suspicious nevus    -  1      Care Instructions    Call to set up a dermatology visit           Follow-ups after your visit        Additional Services     DERMATOLOGY REFERRAL       Your provider has referred you to:   [facility] [place] [phone]    [facility] [place] [phone]   [email or web address]    Please be aware that coverage of these services is subject to the terms and limitations of your health insurance plan.  Call member services at your health plan with any benefit or coverage questions.      Please bring the following with you to your appointment:    (1) Any X-Rays, CTs or MRIs which have been performed.  Contact the facility where they were done to arrange for  prior to your scheduled appointment.    (2) List of current medications  (3) This referral request   (4) Any documents/labs given to you for this referral                  Who to contact     If you have questions or need follow up information about today's clinic visit or your schedule please contact [facility] directly at [phone].  Normal or non-critical lab and imaging results will be communicated to you by MyChart, letter or phone within 4 business days after the clinic has received the results. If you do not hear from us within 7 days, please contact the clinic through MyChart or phone. If you have a critical or abnormal lab result, we will notify you by phone as soon as possible.  Submit refill requests through [employer] or call your pharmacy and they will forward the refill request to us. Please allow 3 business days for your refill to be completed.           "Additional Information About Your Visit        MyChart Information     [employer] gives you secure access to your electronic health record. If you see a primary care provider, you can also send messages to your care team and make appointments. If you have questions, please call your primary care clinic.  If you do not have a primary care provider, please call [phone] and they will assist you.        Care EveryWhere ID     This is your Care EveryWhere ID. This could be used by other organizations to access your [place] medical records  [id number]        Your Vitals Were     Pulse Temperature Respirations Height Pulse Oximetry BMI (Body Mass Index)    88 97.1  F (36.2  C) (Tympanic) 15 5' 10.5\" (1.791 m) 94% 44.28 kg/m2       Blood Pressure from Last 3 Encounters:   10/26/18 110/76   09/20/17 114/78   06/06/17 120/70    Weight from Last 3 Encounters:   10/26/18 313 lb (142 kg)   09/20/17 296 lb 12.8 oz (134.6 kg)   06/06/17 298 lb (135.2 kg)              We Performed the Following     DERMATOLOGY REFERRAL          Today's Medication Changes          These changes are accurate as of 10/26/18 10:39 AM.  If you have any questions, ask your nurse or doctor.               Stop taking these medicines if you haven't already. Please contact your care team if you have questions.     sulfamethoxazole-trimethoprim 800-160 MG per tablet   Commonly known as:  BACTRIM DS/SEPTRA DS   Stopped by:  Artemio Deleon MD           terbinafine 250 MG tablet   Commonly known as:  lamISIL   Stopped by:  Artemio Deleon MD           triamcinolone 0.1 % cream   Commonly known as:  KENALOG   Stopped by:  Artemio Deleon MD                    Primary Care Provider Office Phone # Fax #    Artemio Deleon [phone] [address] [place] [address]        Equal Access to Services     [facility] BELKIS AH: Hadii aad ku hadasho Soomaali, waaxda luqadaha, qaybta kaalramses guerrero " lalainey bazan. So [facility] [phone].    ATENCIÓN: Si habla lyssa, tiene a kitchen disposición servicios gratuitos de asistencia lingüística. Llame al [phone].    We comply with applicable federal civil rights laws and [place] laws. We do not discriminate on the basis of race, color, national origin, age, disability, sex, sexual orientation, or gender identity.            Thank you!     Thank you for choosing [facility]  for your care. Our goal is always to provide you with excellent care. Hearing back from our patients is one way we can continue to improve our services. Please take a few minutes to complete the written survey that you may receive in the mail after your visit with us. Thank you!             Your Updated Medication List - Protect others around you: Learn how to safely use, store and throw away your medicines at www.disposemymeds.org.          This list is accurate as of 10/26/18 10:39 AM.  Always use your most recent med list.                   Brand Name Dispense Instructions for use Diagnosis    ALLEGRA PO      Reported on 5/9/2017        Multi-vitamin Tabs tablet      Take 1 tablet by mouth daily Reported on 5/9/2017

## 2018-11-14 ENCOUNTER — TRANSFERRED RECORDS (OUTPATIENT)
Dept: HEALTH INFORMATION MANAGEMENT | Facility: CLINIC | Age: 38
End: 2018-11-14

## 2018-11-19 ENCOUNTER — HOSPITAL ENCOUNTER (EMERGENCY)
Facility: CLINIC | Age: 38
Discharge: HOME | End: 2018-11-19
Attending: EMERGENCY MEDICINE | Admitting: EMERGENCY MEDICINE
Payer: COMMERCIAL

## 2018-11-19 ENCOUNTER — APPOINTMENT (OUTPATIENT)
Dept: GENERAL RADIOLOGY | Facility: CLINIC | Age: 38
End: 2018-11-19
Attending: EMERGENCY MEDICINE
Payer: COMMERCIAL

## 2018-11-19 VITALS
TEMPERATURE: 100.8 F | OXYGEN SATURATION: 96 % | DIASTOLIC BLOOD PRESSURE: 72 MMHG | HEART RATE: 101 BPM | SYSTOLIC BLOOD PRESSURE: 121 MMHG

## 2018-11-19 DIAGNOSIS — J10.1 INFLUENZA B: ICD-10-CM

## 2018-11-19 LAB
ANION GAP SERPL CALCULATED.3IONS-SCNC: 5 MMOL/L (ref 3–14)
BASOPHILS # BLD AUTO: 0.1 10E9/L (ref 0–0.2)
BASOPHILS NFR BLD AUTO: 0.5 %
BUN SERPL-MCNC: 12 MG/DL (ref 7–30)
CALCIUM SERPL-MCNC: 8.7 MG/DL (ref 8.5–10.1)
CHLORIDE SERPL-SCNC: 107 MMOL/L (ref 94–109)
CO2 SERPL-SCNC: 28 MMOL/L (ref 20–32)
CREAT SERPL-MCNC: 0.88 MG/DL (ref 0.66–1.25)
DIFFERENTIAL METHOD BLD: ABNORMAL
EOSINOPHIL # BLD AUTO: 0.2 10E9/L (ref 0–0.7)
EOSINOPHIL NFR BLD AUTO: 1.5 %
ERYTHROCYTE [DISTWIDTH] IN BLOOD BY AUTOMATED COUNT: 12.4 % (ref 10–15)
FLUAV+FLUBV AG SPEC QL: NEGATIVE
FLUAV+FLUBV AG SPEC QL: POSITIVE
GFR SERPL CREATININE-BSD FRML MDRD: >90 ML/MIN/1.7M2
GLUCOSE SERPL-MCNC: 90 MG/DL (ref 70–99)
HCT VFR BLD AUTO: 43.6 % (ref 40–53)
HGB BLD-MCNC: 14.6 G/DL (ref 13.3–17.7)
IMM GRANULOCYTES # BLD: 0.2 10E9/L (ref 0–0.4)
IMM GRANULOCYTES NFR BLD: 1 %
LYMPHOCYTES # BLD AUTO: 3.9 10E9/L (ref 0.8–5.3)
LYMPHOCYTES NFR BLD AUTO: 27.3 %
MCH RBC QN AUTO: 30.3 PG (ref 26.5–33)
MCHC RBC AUTO-ENTMCNC: 33.5 G/DL (ref 31.5–36.5)
MCV RBC AUTO: 91 FL (ref 78–100)
MONOCYTES # BLD AUTO: 1.3 10E9/L (ref 0–1.3)
MONOCYTES NFR BLD AUTO: 8.9 %
NEUTROPHILS # BLD AUTO: 8.7 10E9/L (ref 1.6–8.3)
NEUTROPHILS NFR BLD AUTO: 60.8 %
NRBC # BLD AUTO: 0 10*3/UL
NRBC BLD AUTO-RTO: 0 /100
PLATELET # BLD AUTO: 242 10E9/L (ref 150–450)
POTASSIUM SERPL-SCNC: 3.5 MMOL/L (ref 3.4–5.3)
RBC # BLD AUTO: 4.82 10E12/L (ref 4.4–5.9)
SODIUM SERPL-SCNC: 140 MMOL/L (ref 133–144)
SPECIMEN SOURCE: ABNORMAL
WBC # BLD AUTO: 14.3 10E9/L (ref 4–11)

## 2018-11-19 PROCEDURE — 96360 HYDRATION IV INFUSION INIT: CPT

## 2018-11-19 PROCEDURE — 80048 BASIC METABOLIC PNL TOTAL CA: CPT | Performed by: EMERGENCY MEDICINE

## 2018-11-19 PROCEDURE — 94640 AIRWAY INHALATION TREATMENT: CPT

## 2018-11-19 PROCEDURE — 25000125 ZZHC RX 250: Performed by: EMERGENCY MEDICINE

## 2018-11-19 PROCEDURE — 87040 BLOOD CULTURE FOR BACTERIA: CPT | Performed by: EMERGENCY MEDICINE

## 2018-11-19 PROCEDURE — 87804 INFLUENZA ASSAY W/OPTIC: CPT | Performed by: EMERGENCY MEDICINE

## 2018-11-19 PROCEDURE — 99284 EMERGENCY DEPT VISIT MOD MDM: CPT | Mod: 25

## 2018-11-19 PROCEDURE — 25000132 ZZH RX MED GY IP 250 OP 250 PS 637: Performed by: EMERGENCY MEDICINE

## 2018-11-19 PROCEDURE — 25000128 H RX IP 250 OP 636: Performed by: EMERGENCY MEDICINE

## 2018-11-19 PROCEDURE — 71046 X-RAY EXAM CHEST 2 VIEWS: CPT

## 2018-11-19 PROCEDURE — 85025 COMPLETE CBC W/AUTO DIFF WBC: CPT | Performed by: EMERGENCY MEDICINE

## 2018-11-19 PROCEDURE — 36415 COLL VENOUS BLD VENIPUNCTURE: CPT | Performed by: EMERGENCY MEDICINE

## 2018-11-19 RX ORDER — IBUPROFEN 600 MG/1
600 TABLET, FILM COATED ORAL ONCE
Status: COMPLETED | OUTPATIENT
Start: 2018-11-19 | End: 2018-11-19

## 2018-11-19 RX ORDER — AZITHROMYCIN 250 MG/1
TABLET, FILM COATED ORAL DAILY
COMMUNITY
End: 2018-12-04

## 2018-11-19 RX ORDER — IPRATROPIUM BROMIDE AND ALBUTEROL SULFATE 2.5; .5 MG/3ML; MG/3ML
3 SOLUTION RESPIRATORY (INHALATION) ONCE
Status: COMPLETED | OUTPATIENT
Start: 2018-11-19 | End: 2018-11-19

## 2018-11-19 RX ADMIN — IBUPROFEN 600 MG: 600 TABLET ORAL at 19:21

## 2018-11-19 RX ADMIN — IPRATROPIUM BROMIDE AND ALBUTEROL SULFATE 3 ML: .5; 3 SOLUTION RESPIRATORY (INHALATION) at 19:22

## 2018-11-19 RX ADMIN — SODIUM CHLORIDE, POTASSIUM CHLORIDE, SODIUM LACTATE AND CALCIUM CHLORIDE 1000 ML: 600; 310; 30; 20 INJECTION, SOLUTION INTRAVENOUS at 19:25

## 2018-11-19 ASSESSMENT — ENCOUNTER SYMPTOMS
SINUS PRESSURE: 1
CHILLS: 1
SORE THROAT: 1
FEVER: 1
ABDOMINAL PAIN: 0
COUGH: 1
SHORTNESS OF BREATH: 1
NAUSEA: 0
MYALGIAS: 1
VOMITING: 0

## 2018-11-19 NOTE — ED AVS SNAPSHOT
[facility] Emergency Department    [address]    [facility] [address]    Phone:  [phone]    Fax:  [phone]                                       Hayden Nelson   MRN: [id number]    Department:  [facility] Emergency Department   Date of Visit:  11/19/2018           Patient Information     Date Of Birth          1980        Your diagnoses for this visit were:     Influenza B        You were seen by Edgar Duran DO.      Follow-up Information     Follow up with Artemio Deleon MD. Call in 2 days.    Specialties:  Internal Medicine, Pediatrics    Why:  As needed    Contact information:    [address]   [place] [address]  [phone]          Follow up with [facility] Emergency Department.    Specialty:  EMERGENCY MEDICINE    Why:  If symptoms worsen    Contact information:    [address]  [facility] [address]  [phone]        Discharge Instructions         Influenza (Adult)    Influenza is also called the flu. It is a viral illness that affects the air passages of your lungs. It is different from the common cold. The flu can easily be passed from one to person to another. It may be spread through the air by coughing and sneezing. Or it can be spread by touching the sick person and then touching your own eyes, nose, or mouth.  The flu starts 1 to 3 days after you are exposed to the flu virus. It may last for 1 to 2 weeks but many people feel tired or fatigued for many weeks afterward. You usually don t need to take antibiotics unless you have a complication. This might be an ear or sinus infection or pneumonia.  Symptoms of the flu may be mild or severe. They can include extreme tiredness (wanting to stay in bed all day), chills, fevers, muscle aches, soreness with eye movement, headache, and a dry, hacking cough.  Home care  Follow these guidelines when caring for yourself at home:    Avoid being  around cigarette smoke, whether yours or other people s.    Acetaminophen or ibuprofen will help ease your fever, muscle aches, and headache. Don t give aspirin to anyone younger than 18 who has the flu. Aspirin can harm the liver.    Nausea and loss of appetite are common with the flu. Eat light meals. Drink 6 to 8 glasses of liquids every day. Good choices are water, sport drinks, soft drinks without caffeine, juices, tea, and soup. Extra fluids will also help loosen secretions in your nose and lungs.    Over-the-counter cold medicines will not make the flu go away faster. But the medicines may help with coughing, sore throat, and congestion in your nose and sinuses. Don t use a decongestant if you have high blood pressure.    Stay home until your fever has been gone for at least 24 hours without using medicine to reduce fever.  Follow-up care  Follow up with your healthcare provider, or as advised, if you are not getting better over the next week.  If you are age 65 or older, talk with your provider about getting a pneumococcal vaccine every 5 years. You should also get this vaccine if you have chronic asthma or COPD. All adults should get a flu vaccine every fall. Ask your provider about this.  When to seek medical advice  Call your healthcare provider right away if any of these occur:    Cough with lots of colored mucus (sputum) or blood in your mucus    Chest pain, shortness of breath, wheezing, or trouble breathing    Severe headache, or face, neck, or ear pain    New rash with fever    Fever of 100.4 F (38 C) or higher, or as directed by your healthcare provider    Confusion, behavior change, or seizure    Severe weakness or dizziness    You get a new fever or cough after getting better for a few days  Date Last Reviewed: 1/1/2017 2000-2018 [employer]. [address] [place] [address], [place] [address]. All rights reserved. This information is not intended as a substitute for professional medical  care. Always follow your healthcare professional's instructions.          24 Hour Appointment Hotline       To make an appointment at any [facility], call [phone] ([phone]). If you don't have a family doctor or clinic, we will help you find one. [facility] clinics are conveniently located to serve the needs of you and your family.             Review of your medicines      Our records show that you are taking the medicines listed below. If these are incorrect, please call your family doctor or clinic.        Dose / Directions Last dose taken    ALBUTEROL IN        Refills:  0        PREDNISONE PO        Refills:  0        ZITHROMAX Z-DONOVAN 250 MG tablet   Generic drug:  azithromycin        Take by mouth daily   Refills:  0                Procedures and tests performed during your visit     Procedure/Test Number of Times Performed    Basic metabolic panel 1    Blood culture 2    CBC with platelets differential 1    Influenza A/B antigen 1    XR Chest 2 Views 1      Orders Needing Specimen Collection     None      Pending Results     Date and Time Order Name Status Description    11/19/2018 1909 Blood culture In process     11/19/2018 1905 Blood culture In process             Pending Culture Results     Date and Time Order Name Status Description    11/19/2018 1909 Blood culture In process     11/19/2018 1905 Blood culture In process             Pending Results Instructions     If you had any lab results that were not finalized at the time of your Discharge, you can call the ED Lab Result RN at [phone]. You will be contacted by this team for any positive Lab results or changes in treatment. The nurses are available 7 days a week from 10A to 6:30P.  You can leave a message 24 hours per day and they will return your call.        Test Results From Your Hospital Stay        11/19/2018  7:18 PM      Component Results     Component Value Ref Range & Units Status    WBC 14.3 (H) 4.0 - 11.0 10e9/L Final     RBC Count 4.82 4.4 - 5.9 10e12/L Final    Hemoglobin 14.6 13.3 - 17.7 g/dL Final    Hematocrit 43.6 40.0 - 53.0 % Final    MCV 91 78 - 100 fl Final    MCH 30.3 26.5 - 33.0 pg Final    MCHC 33.5 31.5 - 36.5 g/dL Final    RDW 12.4 10.0 - 15.0 % Final    Platelet Count 242 150 - 450 10e9/L Final    Diff Method Automated Method  Final    % Neutrophils 60.8 % Final    % Lymphocytes 27.3 % Final    % Monocytes 8.9 % Final    % Eosinophils 1.5 % Final    % Basophils 0.5 % Final    % Immature Granulocytes 1.0 % Final    Nucleated RBCs 0 0 /100 Final    Absolute Neutrophil 8.7 (H) 1.6 - 8.3 10e9/L Final    Absolute Lymphocytes 3.9 0.8 - 5.3 10e9/L Final    Absolute Monocytes 1.3 0.0 - 1.3 10e9/L Final    Absolute Eosinophils 0.2 0.0 - 0.7 10e9/L Final    Absolute Basophils 0.1 0.0 - 0.2 10e9/L Final    Abs Immature Granulocytes 0.2 0 - 0.4 10e9/L Final    Absolute Nucleated RBC 0.0  Final         11/19/2018  7:35 PM      Component Results     Component Value Ref Range & Units Status    Sodium 140 133 - 144 mmol/L Final    Potassium 3.5 3.4 - 5.3 mmol/L Final    Chloride 107 94 - 109 mmol/L Final    Carbon Dioxide 28 20 - 32 mmol/L Final    Anion Gap 5 3 - 14 mmol/L Final    Glucose 90 70 - 99 mg/dL Final    Urea Nitrogen 12 7 - 30 mg/dL Final    Creatinine 0.88 0.66 - 1.25 mg/dL Final    GFR Estimate >90 >60 mL/min/1.7m2 Final    Non  GFR Calc    GFR Estimate If Black >90 >60 mL/min/1.7m2 Final    African American GFR Calc    Calcium 8.7 8.5 - 10.1 mg/dL Final         11/19/2018  7:09 PM         11/19/2018  7:40 PM      Narrative     CHEST TWO VIEWS    11/19/2018 7:19 PM     HISTORY: Fever, cough.      COMPARISON: 8/20/2014.        Impression     IMPRESSION: No acute cardiopulmonary disease.     GAURAV STAHL MD         11/19/2018  7:41 PM      Component Results     Component Value Ref Range & Units Status    Influenza A/B Agn Specimen Nasopharyngeal  Final    Influenza A Negative NEG^Negative Final     Influenza B Positive (A) NEG^Negative Final    Test results must be correlated with clinical data. If necessary, results   should be confirmed by a molecular assay or viral culture.           11/19/2018  7:27 PM                Clinical Quality Measure: Blood Pressure Screening     Your blood pressure was checked while you were in the emergency department today. The last reading we obtained was  BP: 121/72 . Please read the guidelines below about what these numbers mean and what you should do about them.  If your systolic blood pressure (the top number) is less than 120 and your diastolic blood pressure (the bottom number) is less than 80, then your blood pressure is normal. There is nothing more that you need to do about it.  If your systolic blood pressure (the top number) is 120-139 or your diastolic blood pressure (the bottom number) is 80-89, your blood pressure may be higher than it should be. You should have your blood pressure rechecked within a year by a primary care provider.  If your systolic blood pressure (the top number) is 140 or greater or your diastolic blood pressure (the bottom number) is 90 or greater, you may have high blood pressure. High blood pressure is treatable, but if left untreated over time it can put you at risk for heart attack, stroke, or kidney failure. You should have your blood pressure rechecked by a primary care provider within the next 4 weeks.  If your provider in the emergency department today gave you specific instructions to follow-up with your doctor or provider even sooner than that, you should follow that instruction and not wait for up to 4 weeks for your follow-up visit.        Thank you for choosing [facility]       Thank you for choosing [facility] for your care. Our goal is always to provide you with excellent care. Hearing back from our patients is one way we can continue to improve our services. Please take a few minutes to complete the written survey that you may  receive in the mail after you visit with us. Thank you!        [employer] Information     [employer] gives you secure access to your electronic health record. If you see a primary care provider, you can also send messages to your care team and make appointments. If you have questions, please call your primary care clinic.  If you do not have a primary care provider, please call [phone] and they will assist you.        Care EveryWhere ID     This is your Care EveryWhere ID. This could be used by other organizations to access your [place] medical records  [id number]        Equal Access to Services     [facility] : Edi Palm, arianna lima, cameron colin, ramses lopez . So [facility] [phone].    ATENCIÓN: Si habla español, tiene a kitchen disposición servicios gratuitos de asistencia lingüística. Llame al [phone].    We comply with applicable federal civil rights laws and [place] laws. We do not discriminate on the basis of race, color, national origin, age, disability, sex, sexual orientation, or gender identity.            After Visit Summary       This is your record. Keep this with you and show to your community pharmacist(s) and doctor(s) at your next visit.

## 2018-11-19 NOTE — ED AVS SNAPSHOT
[facility] Emergency Department    [address]    [facility] [address]    Phone:  [phone]    Fax:  [phone]                                       Hayden Nelson   MRN: [id number]    Department:  [facility] Emergency Department   Date of Visit:  11/19/2018           After Visit Summary Signature Page     I have received my discharge instructions, and my questions have been answered. I have discussed any challenges I see with this plan with the nurse or doctor.    ..........................................................................................................................................  Patient/Patient Representative Signature      ..........................................................................................................................................  Patient Representative Print Name and Relationship to Patient    ..................................................               ................................................  Date                                   Time    ..........................................................................................................................................  Reviewed by Signature/Title    ...................................................              ..............................................  Date                                               Time          22EPIC Rev 08/18

## 2018-11-20 NOTE — ED TRIAGE NOTES
Patient comes in for evaluation of worsening cough and breathing. Patient was seen in [facility] on Wednesday, had chest xray and was started on zpak and prednisone. Patient states he has not improved. Is also using a nebulizer at home.

## 2018-11-20 NOTE — DISCHARGE INSTRUCTIONS
Influenza (Adult)    Influenza is also called the flu. It is a viral illness that affects the air passages of your lungs. It is different from the common cold. The flu can easily be passed from one to person to another. It may be spread through the air by coughing and sneezing. Or it can be spread by touching the sick person and then touching your own eyes, nose, or mouth.  The flu starts 1 to 3 days after you are exposed to the flu virus. It may last for 1 to 2 weeks but many people feel tired or fatigued for many weeks afterward. You usually don t need to take antibiotics unless you have a complication. This might be an ear or sinus infection or pneumonia.  Symptoms of the flu may be mild or severe. They can include extreme tiredness (wanting to stay in bed all day), chills, fevers, muscle aches, soreness with eye movement, headache, and a dry, hacking cough.  Home care  Follow these guidelines when caring for yourself at home:    Avoid being around cigarette smoke, whether yours or other people s.    Acetaminophen or ibuprofen will help ease your fever, muscle aches, and headache. Don t give aspirin to anyone younger than 18 who has the flu. Aspirin can harm the liver.    Nausea and loss of appetite are common with the flu. Eat light meals. Drink 6 to 8 glasses of liquids every day. Good choices are water, sport drinks, soft drinks without caffeine, juices, tea, and soup. Extra fluids will also help loosen secretions in your nose and lungs.    Over-the-counter cold medicines will not make the flu go away faster. But the medicines may help with coughing, sore throat, and congestion in your nose and sinuses. Don t use a decongestant if you have high blood pressure.    Stay home until your fever has been gone for at least 24 hours without using medicine to reduce fever.  Follow-up care  Follow up with your healthcare provider, or as advised, if you are not getting better over the next week.  If you are age 65 or  older, talk with your provider about getting a pneumococcal vaccine every 5 years. You should also get this vaccine if you have chronic asthma or COPD. All adults should get a flu vaccine every fall. Ask your provider about this.  When to seek medical advice  Call your healthcare provider right away if any of these occur:    Cough with lots of colored mucus (sputum) or blood in your mucus    Chest pain, shortness of breath, wheezing, or trouble breathing    Severe headache, or face, neck, or ear pain    New rash with fever    Fever of 100.4 F (38 C) or higher, or as directed by your healthcare provider    Confusion, behavior change, or seizure    Severe weakness or dizziness    You get a new fever or cough after getting better for a few days  Date Last Reviewed: 1/1/2017 2000-2018 [employer]. [address] [place] [address], [place] [address]. All rights reserved. This information is not intended as a substitute for professional medical care. Always follow your healthcare professional's instructions.

## 2018-11-20 NOTE — ED PROVIDER NOTES
History     Chief Complaint:  Cough and shortness of breath     The history is provided by the patient.      Hayden Nelson is a 38 year old male who presents with cough, fever, and shortness of breath. The patient states that approximately a week ago he became ill with sinus pressure, sore throat, congestion, and cough. He was trying over the counter medications without relief and was ultimately seen 4 days ago at a nearby [facility] Room where he had received nebulizers and was diagnosed with atypical pneumonia and sent home on 5 day course of Prednisone as well as Azithromycin and an albuterol inhaler. The patient has taken all of these medications as prescribed but has had little to no improvement in symptoms. The patient states that he has had a persistent cough and shortness of breath, which is worse when he does anything to exert himself. His cough has been productive with white and green phlegm. The patient finished his Z-pack and Prednisone last night but still had a severe cough and congestion, and shortness of breath prompting his visit here tonight. The patient denies chest pain. He has been trying his inhaler without much help. He denies nausea, vomiting. The patient is a 2 to 2.5 pack per day smoker. He notes over the past few days he has had less than half a pack a day however as his cough is provoked with smoking.    Allergies:  Carafate  Omeprazole Magnesium      Medications:    Albuterol inhaler  Z-pack  Prednisone     Past Medical History:    Plantar fasciitis   Fatty liver  GERD  AVERY on CPAP    Past Surgical History:    Repair nasal septum     Family History:    Lung cancer, type 1 diabetes     Social History:  Smoking Status: Current Smoker  Alcohol Use: Rarely  Marital Status:        Review of Systems   Constitutional: Positive for chills and fever.   HENT: Positive for congestion, sinus pressure and sore throat.    Respiratory: Positive for cough and shortness of breath.     Cardiovascular: Negative for chest pain.   Gastrointestinal: Negative for abdominal pain, nausea and vomiting.   Musculoskeletal: Positive for myalgias.   All other systems reviewed and are negative.      Physical Exam   Patient Vitals for the past 24 hrs:   BP Temp Temp src Pulse Heart Rate SpO2   11/19/18 2015 - - - - - 96 %   11/19/18 2000 121/72 - - - - 93 %   11/19/18 1957 - - - 101 101 -   11/19/18 1945 117/67 - - - - 95 %   11/19/18 1842 (!) 145/92 100.8  F (38.2  C) Oral 111 - 96 %        Physical Exam  Constitutional: Vital signs reviewed as above.   Head: No external signs of trauma noted.  Eyes: Pupils are equal, round, and reactive to light.   Neck: No JVD noted  Cardiovascular: Tachycardic rate, regular rhythm and normal heart sounds.  No murmur heard. Equal B/L peripheral pulses.  Pulmonary/Chest: Effort normal and breath sounds normal. No respiratory distress. Patient has slight wheezes B/L. Patient has some crackles in the B/L bases.   Gastrointestinal: Soft. There is no tenderness.   Musculoskeletal/Extremities: No edema noted. Normal tone.  Neurological: Patient is alert and oriented to person, place, and time.   Skin: Skin is warm and dry. There is no diaphoresis noted.   Psychiatric: The patient appears calm.    Emergency Department Course     Imaging:  Radiographic findings were communicated with the patient who voiced understanding of the findings.    X-ray Chest, 2 views:  No acute cardiopulmonary disease.  Result per radiology.     Laboratory:  CBC: WBC 14.3 (H), otherwise WNL (HGB 14.6, )   BMP: WNL (Creatinine 0.88)   Influenza A/B: B positive  Blood Culture x2: Pending     Interventions:  1921 - Ibuprofen 600 mg PO  1922 - DuoNeb, 2.5mg/3 mL, Inhalation solution, Nebulizer   1925 - LR 1L IV Bolus     Emergency Department Course:  Past medical records, nursing notes, and vitals reviewed.  1855: I performed an exam of the patient and obtained history, as documented above.    IV  inserted and blood drawn.     1955: I rechecked the patient. Findings and plan explained to the Patient. Patient discharged home with instructions regarding supportive care, medications, and reasons to return. The importance of close follow-up was reviewed.      Impression & Plan      Medical Decision Making:  Hayden Nelson is a 38 year old male who presents for evaluation of cough, fever and myalgias.  The patient has had symptoms for about a week at this point and was seen in an outside clinic where chest X-ray was nondiagnostic. He was started empirically on a Z-pack and Prednisone for a bronchospasm given his 2.5 pack per day smoking history but has not had any improvement. He comes in today with ongoing symptoms as well as fever of 100.8 F. Basic blood work is overall reassuring apart from a white count of 14. However, influenza swab confirms Influenza B, consistent with his presentation. They are at risk for pneumonia but no signs of this are detected on today's visit as confirmed by chest X-ray.  Close followup of primary care physician is indicated and return to the ED for high fevers > 103 for more than 48 hours more, increasing productive cough, shortness of breath, or confusion.  There is no signs of serious bacterial infection such as bacteremia, meningitis, UTI/pyelonephritis, strep pharyngitis, etc.  Anticipatory guidance given prior to discharge.      Diagnosis:    ICD-10-CM    1. Influenza B J10.1        Yoan Finney  11/19/2018   [facility] EMERGENCY DEPARTMENT  Yoan MONTE, am serving as a scribe at 8:14 PM on 11/19/2018 to document services personally performed by Edgar Duran DO based on my observations and the provider's statements to me.       Edgar Duran DO  11/19/18 7523

## 2018-11-25 LAB
BACTERIA SPEC CULT: NO GROWTH
BACTERIA SPEC CULT: NO GROWTH
Lab: NORMAL
Lab: NORMAL
SPECIMEN SOURCE: NORMAL
SPECIMEN SOURCE: NORMAL

## 2018-12-04 ENCOUNTER — OFFICE VISIT (OUTPATIENT)
Dept: PEDIATRICS | Facility: CLINIC | Age: 38
End: 2018-12-04
Payer: COMMERCIAL

## 2018-12-04 VITALS
HEART RATE: 102 BPM | BODY MASS INDEX: 43.44 KG/M2 | DIASTOLIC BLOOD PRESSURE: 77 MMHG | SYSTOLIC BLOOD PRESSURE: 117 MMHG | TEMPERATURE: 97.4 F | WEIGHT: 307.1 LBS | OXYGEN SATURATION: 96 %

## 2018-12-04 DIAGNOSIS — J20.9 ACUTE BRONCHITIS, UNSPECIFIED ORGANISM: Primary | ICD-10-CM

## 2018-12-04 PROCEDURE — 99213 OFFICE O/P EST LOW 20 MIN: CPT | Performed by: INTERNAL MEDICINE

## 2018-12-04 RX ORDER — DOXYCYCLINE 100 MG/1
100 CAPSULE ORAL 2 TIMES DAILY
Qty: 20 CAPSULE | Refills: 0 | Status: SHIPPED | OUTPATIENT
Start: 2018-12-04 | End: 2019-10-08

## 2018-12-04 NOTE — PATIENT INSTRUCTIONS
Doxycyline 100 mg - twice per day for 10 days    Mucinex or Delsym or Robitussin scheduled    If your symptoms are not improved by next week, contact me to repeat a chest x-ray       [facility] - [place] [phone]    [facility] [place] [phone]

## 2018-12-04 NOTE — PROGRESS NOTES
SUBJECTIVE:   Hayden Nelson is a 38 year old male who presents to clinic today for the following health issues    ED/UC Followup:    Facility:  [facility] in [place] and [facility] in   Date of visit: 11-14-18 and 11-19-18  Reason for visit: Dx with pneumonia and influenza B.  Current Status: Sore throat and hacking cough, otherwise feeling ok.     Initially evaluated in mid November at [facility].  Dx w pneumonia.  Treated w/ atbx, prednisone, albuterol    Sinuses felt better, developed throat fullness and abnormal voice    Evaluated then in ED. Positive test for influenza B.  OTC meds given.    Took 5 days off work.     Dry cough, nonproductive  Still w/ abnormal voice  Increased cough w/ exertion or temperature change  No fever in over 1 week      Problem list and histories reviewed & adjusted, as indicated.    Labs reviewed in EPIC    Reviewed and updated as needed this visit by Provider  Tobacco  Allergies  Meds  Problems  Med Hx  Surg Hx  Fam Hx  Soc Hx          ROS:  Constitutional, HEENT, cardiovascular, pulmonary, gi and gu systems are negative, except as otherwise noted.    OBJECTIVE:     /77  Pulse 102  Temp 97.4  F (36.3  C) (Tympanic)  Wt 307 lb 1.6 oz (139.3 kg)  SpO2 96%  BMI 43.44 kg/m2  Body mass index is 43.44 kg/(m^2).  GEN: No distress  SKIN: No rashes  HEENT: PERRL. EOMI. TM's clear bilaterally. Nasal mucosa edematous. OP moist without lesions.   NECK: Supple. No LAD or TM.  LUNGS: Diffuse late inspiratory wheezes. No retractions.   CV: Regular rate and rhythm.  No murmurs, rubs or gallops. Pulses 2+ radial.  EXTR: no edema    ASSESSMENT/PLAN:       ICD-10-CM    1. Acute bronchitis, unspecified organism J20.9 doxycycline hyclate (VIBRAMYCIN) 100 MG capsule     Sx are likely bronchitis in origin    Patient Instructions   Doxycyline 100 mg - twice per day for 10 days    Mucinex or Delsym or Robitussin scheduled    If your symptoms are not improved by next week, contact me to  repeat a chest x-ray       Artemio Deleon MD  [facility]

## 2018-12-04 NOTE — MR AVS SNAPSHOT
After Visit Summary   12/4/2018    Hayden Nelson    MRN: [id number]           Patient Information     Date Of Birth          1980        Visit Information        Provider Department      12/4/2018 3:00 PM Artemio Deleon MD [facility]        Today's Diagnoses     Acute bronchitis, unspecified organism    -  1      Care Instructions    Doxycyline 100 mg - twice per day for 10 days    Mucinex or Delsym or Robitussin scheduled    If your symptoms are not improved by next week, contact me to repeat a chest x-ray       [facility] - [place] [phone]    [facility] [place] [phone]          Follow-ups after your visit        Who to contact     If you have questions or need follow up information about today's clinic visit or your schedule please contact [facility] directly at [phone].  Normal or non-critical lab and imaging results will be communicated to you by MyChart, letter or phone within 4 business days after the clinic has received the results. If you do not hear from us within 7 days, please contact the clinic through MyChart or phone. If you have a critical or abnormal lab result, we will notify you by phone as soon as possible.  Submit refill requests through [employer] or call your pharmacy and they will forward the refill request to us. Please allow 3 business days for your refill to be completed.          Additional Information About Your Visit        MyChart Information     [employer] gives you secure access to your electronic health record. If you see a primary care provider, you can also send messages to your care team and make appointments. If you have questions, please call your primary care clinic.  If you do not have a primary care provider, please call [phone] and they will assist you.        Care EveryWhere ID     This is your Care EveryWhere ID. This could be used by other organizations to access your [facility]  medical records  [id number]        Your Vitals Were     Pulse Temperature Pulse Oximetry BMI (Body Mass Index)          102 97.4  F (36.3  C) (Tympanic) 96% 43.44 kg/m2         Blood Pressure from Last 3 Encounters:   12/04/18 117/77   11/19/18 121/72   10/26/18 110/76    Weight from Last 3 Encounters:   12/04/18 307 lb 1.6 oz (139.3 kg)   10/26/18 313 lb (142 kg)   09/20/17 296 lb 12.8 oz (134.6 kg)              Today, you had the following     No orders found for display         Today's Medication Changes          These changes are accurate as of 12/4/18  3:31 PM.  If you have any questions, ask your nurse or doctor.               Start taking these medicines.        Dose/Directions    doxycycline hyclate 100 MG capsule   Commonly known as:  VIBRAMYCIN   Used for:  Acute bronchitis, unspecified organism   Started by:  Artemio Deleon MD        Dose:  100 mg   Take 1 capsule (100 mg) by mouth 2 times daily   Quantity:  20 capsule   Refills:  0            Where to get your medicines      These medications were sent to [place] [address] [place] [facility] [place], [place] - [address]  [address] [id number]     Phone:  [phone]     doxycycline hyclate 100 MG capsule                Primary Care Provider Office Phone # Fax #    Artemio Deleon [phone]       [facility] [place] [address]        Equal Access to Services     [facility] AH: Hadii jamel godinezo Sokei, waaxda luqadaha, qaybta kaalmada dixie, wax maryan bazan. So [facility] [phone].    ATENCIÓN: Si habla español, tiene a kitchen disposición servicios gratuitos de asistencia lingüística. Llame al [phone].    We comply with applicable federal civil rights laws and [place] laws. We do not discriminate on the basis of race, color, national origin, age, disability, sex, sexual orientation, or gender identity.            Thank you!     Thank you for choosing [facility]  for  your care. Our goal is always to provide you with excellent care. Hearing back from our patients is one way we can continue to improve our services. Please take a few minutes to complete the written survey that you may receive in the mail after your visit with us. Thank you!             Your Updated Medication List - Protect others around you: Learn how to safely use, store and throw away your medicines at www.disposemymeds.org.          This list is accurate as of 12/4/18  3:31 PM.  Always use your most recent med list.                   Brand Name Dispense Instructions for use Diagnosis    ALBUTEROL IN           doxycycline hyclate 100 MG capsule    VIBRAMYCIN    20 capsule    Take 1 capsule (100 mg) by mouth 2 times daily    Acute bronchitis, unspecified organism

## 2019-01-29 ENCOUNTER — TRANSFERRED RECORDS (OUTPATIENT)
Dept: HEALTH INFORMATION MANAGEMENT | Facility: CLINIC | Age: 39
End: 2019-01-29

## 2019-03-14 ENCOUNTER — TRANSFERRED RECORDS (OUTPATIENT)
Dept: HEALTH INFORMATION MANAGEMENT | Facility: CLINIC | Age: 39
End: 2019-03-14

## 2019-04-03 ENCOUNTER — TRANSFERRED RECORDS (OUTPATIENT)
Dept: HEALTH INFORMATION MANAGEMENT | Facility: CLINIC | Age: 39
End: 2019-04-03

## 2019-06-05 ENCOUNTER — TRANSFERRED RECORDS (OUTPATIENT)
Dept: HEALTH INFORMATION MANAGEMENT | Facility: CLINIC | Age: 39
End: 2019-06-05

## 2019-06-13 ENCOUNTER — TRANSFERRED RECORDS (OUTPATIENT)
Dept: HEALTH INFORMATION MANAGEMENT | Facility: CLINIC | Age: 39
End: 2019-06-13

## 2019-10-02 ENCOUNTER — HEALTH MAINTENANCE LETTER (OUTPATIENT)
Age: 39
End: 2019-10-02

## 2019-10-03 ENCOUNTER — TRANSFERRED RECORDS (OUTPATIENT)
Dept: HEALTH INFORMATION MANAGEMENT | Facility: CLINIC | Age: 39
End: 2019-10-03

## 2019-10-08 ENCOUNTER — ANCILLARY PROCEDURE (OUTPATIENT)
Dept: GENERAL RADIOLOGY | Facility: CLINIC | Age: 39
End: 2019-10-08
Attending: PHYSICIAN ASSISTANT
Payer: COMMERCIAL

## 2019-10-08 ENCOUNTER — OFFICE VISIT (OUTPATIENT)
Dept: URGENT CARE | Facility: URGENT CARE | Age: 39
End: 2019-10-08
Payer: COMMERCIAL

## 2019-10-08 VITALS
SYSTOLIC BLOOD PRESSURE: 138 MMHG | BODY MASS INDEX: 45.52 KG/M2 | HEART RATE: 82 BPM | TEMPERATURE: 96.6 F | OXYGEN SATURATION: 97 % | WEIGHT: 315 LBS | DIASTOLIC BLOOD PRESSURE: 76 MMHG

## 2019-10-08 DIAGNOSIS — J02.9 PHARYNGITIS, UNSPECIFIED ETIOLOGY: ICD-10-CM

## 2019-10-08 DIAGNOSIS — J02.9 PHARYNGITIS, UNSPECIFIED ETIOLOGY: Primary | ICD-10-CM

## 2019-10-08 LAB
DEPRECATED S PYO AG THROAT QL EIA: NORMAL
ERYTHROCYTE [DISTWIDTH] IN BLOOD BY AUTOMATED COUNT: 13.1 % (ref 10–15)
HCT VFR BLD AUTO: 47.9 % (ref 40–53)
HGB BLD-MCNC: 15.9 G/DL (ref 13.3–17.7)
MCH RBC QN AUTO: 30.7 PG (ref 26.5–33)
MCHC RBC AUTO-ENTMCNC: 33.2 G/DL (ref 31.5–36.5)
MCV RBC AUTO: 93 FL (ref 78–100)
PLATELET # BLD AUTO: 230 10E9/L (ref 150–450)
RBC # BLD AUTO: 5.18 10E12/L (ref 4.4–5.9)
SPECIMEN SOURCE: NORMAL
TSH SERPL DL<=0.005 MIU/L-ACNC: 1.64 MU/L (ref 0.4–4)
WBC # BLD AUTO: 13.6 10E9/L (ref 4–11)

## 2019-10-08 PROCEDURE — 85027 COMPLETE CBC AUTOMATED: CPT | Performed by: PHYSICIAN ASSISTANT

## 2019-10-08 PROCEDURE — 36415 COLL VENOUS BLD VENIPUNCTURE: CPT | Performed by: PHYSICIAN ASSISTANT

## 2019-10-08 PROCEDURE — 70360 X-RAY EXAM OF NECK: CPT

## 2019-10-08 PROCEDURE — 99214 OFFICE O/P EST MOD 30 MIN: CPT | Performed by: PHYSICIAN ASSISTANT

## 2019-10-08 PROCEDURE — 87880 STREP A ASSAY W/OPTIC: CPT | Performed by: PHYSICIAN ASSISTANT

## 2019-10-08 PROCEDURE — 87081 CULTURE SCREEN ONLY: CPT | Performed by: PHYSICIAN ASSISTANT

## 2019-10-08 PROCEDURE — 84443 ASSAY THYROID STIM HORMONE: CPT | Performed by: PHYSICIAN ASSISTANT

## 2019-10-08 NOTE — PATIENT INSTRUCTIONS
With distilled water 2-3x per day for a minimum 2-3 days  Mucinex, increased fluids, humidifier at night, steaming in the day  Cough drops and hot saltwater gargles as needed  RANITIDINE 150 twice daily    Adult Self-Care for Colds  Colds are caused by viruses. They can't be cured with antibiotics. However, you can ease symptoms and support your body's efforts to heal itself.  No matter which symptoms you have, be sure to:    Drink plenty of fluids (water or clear soup)    Stop smoking and drinking alcohol    Get plenty of rest    Understand a fever    Take your temperature several times a day. If your fever is 100.4 F (38.0 C) for more than a day, call your healthcare provider.    Relax, lie down. Go to bed if you want. Just get off your feet and rest. Also, drink plenty of fluids to avoid dehydration.    Take acetaminophen or a nonsteroidal anti-inflammatory agent (NSAID), such as ibuprofen.  Treat a troubled nose kindly    Breathe steam or heated humidified air to open blocked nasal passages.  a hot shower or use a vaporizer. Be careful not to get burned by the steam.    Saline nasal sprays and decongestant tablets help open a stuffy nose. Antihistamines can also help, but they can cause side effects such as drowsiness and drying of the eyes, nose, and mouth.  Soothe a sore throat and cough    Gargle every 2 hours with 1/4 teaspoon of salt dissolved in 1/2 cup of warm water. Suck on throat lozenges and cough drops to moisten your throat.    Cough medicines are available but it is unclear how well they actually work.    Take acetaminophen or an NSAID, such as ibuprofen, to ease throat pain  Ease digestive problems    Put fluids back into your body. Take frequent sips of clear liquids such as water or broth. Avoid drinks that have a lot of sugar in them, such as juices and sodas. These can make diarrhea worse. Older children and adults can drink sports drinks.    As your appetite returns, you can resume  your normal diet. Ask your healthcare provider if there are any foods you should avoid.  When to seek medical care  When you first notice symptoms, ask your healthcare provider if antiviral medicines are appropriate. Antibiotics should not be taken for colds or flu. Also, call your healthcare provider if you have any of the following symptoms or if you aren't feeling better after 7 days:    Shortness of breath    Pain or pressure in the chest or belly (abdomen)    Worsening symptoms, especially after a period of improvement    Fever of 100.4 F  (38.0 C) or higher, or fever that doesn't go down with medicine    Sudden dizziness or confusion    Severe or continued vomiting    Signs of dehydration, including extreme thirst, dark urine, infrequent urination, dry mouth    Spotted, red, or very sore throat   Date Last Reviewed: 12/1/2016 2000-2017 [employer]. [address] [place] [address]. All rights reserved. This information is not intended as a substitute for professional medical care. Always follow your healthcare professional's instructions.    Patient Education     Viral Upper Respiratory Illness (Adult)    You have a viral upper respiratory illness (URI), which is another term for the common cold. This illness is contagious during the first few days. It is spread through the air by coughing and sneezing. It may also be spread by direct contact (touching the sick person and then touching your own eyes, nose, or mouth). Frequent handwashing will decrease risk of spread. Most viral illnesses go away within 7 to 10 days with rest and simple home remedies. Sometimes the illness may last for several weeks. Antibiotics will not kill a virus, and they are generally not prescribed for this condition.  Home care    If symptoms are severe, rest at home for the first 2 to 3 days. When you resume activity, don't let yourself get too tired.    Don't smoke. If you need help stopping, talk with your  healthcare provider.    Avoid being exposed to cigarette smoke (yours or others ).    You may use acetaminophen or ibuprofen to control pain and fever, unless another medicine was prescribed. If you have chronic liver or kidney disease, have ever had a stomach ulcer or gastrointestinal bleeding, or are taking blood-thinning medicines, talk with your healthcare provider before using these medicines. Aspirin should never be given to anyone under 18 years of age who is ill with a viral infection or fever. It may cause severe liver or brain damage.    Your appetite may be poor, so a light diet is fine. Stay well hydrated by drinking 6 to 8 glasses of fluids per day (water, soft drinks, juices, tea, or soup). Extra fluids will help loosen secretions in the nose and lungs.    Over-the-counter cold medicines will not shorten the length of time you re sick, but they may be helpful for the following symptoms: cough, sore throat, and nasal and sinus congestion. If you take prescription medicines, ask your healthcare provider or pharmacist which over-the-counter medicines are safe to use. (Note: Don't use decongestants if you have high blood pressure.)  Follow-up care  Follow up with your healthcare provider, or as advised.  When to seek medical advice  Call your healthcare provider right away if any of these occur:    Cough with lots of colored sputum (mucus)    Severe headache; face, neck, or ear pain    Difficulty swallowing due to throat pain    Fever of 100.4 F (38 C) or higher, or as directed by your healthcare provider  Call 911  Call 911 if any of these occur:    Chest pain, shortness of breath, wheezing, or difficulty breathing    Coughing up blood    Very severe pain with swallowing, especially if it goes along with a muffled voice   Date Last Reviewed: 6/1/2018 2000-2018 [employer]. [address] [place] [address], [place] [address]. All rights reserved. This information is not intended as a substitute for  professional medical care. Always follow your healthcare professional's instructions.           Patient Education     Self-Care for Sore Throats    Sore throats happen for many reasons, such as colds, allergies, and infections caused by viruses or bacteria. In any case, your throat becomes red and sore. Your goal for self-care is to reduce your discomfort while giving your throat a chance to heal.  Moisten and soothe your throat  Tips include the following:    Try a sip of water first thing after waking up.    Keep your throat moist by drinking 6 or more glasses of clear liquids every day.    Run a cool-air humidifier in your room overnight.    Avoid cigarette smoke.     Suck on throat lozenges, cough drops, hard candy, ice chips, or frozen fruit-juice bars. Use the sugar-free versions if your diet or medical condition requires them.  Gargle to ease irritation  Gargling every hour or 2 can ease irritation. Try gargling with 1 of these solutions:    1/4 teaspoon of salt in 1/2 cup of warm water    An over-the-counter anesthetic gargle  Use medicine for more relief  Over-the-counter medicine can reduce sore throat symptoms. Ask your pharmacist if you have questions about which medicine to use:    Ease pain with anesthetic sprays. Aspirin or an aspirin substitute also helps. Remember, never give aspirin to anyone 18 or younger, or if you are already taking blood thinners.     For sore throats caused by allergies, try antihistamines to block the allergic reaction.    Remember: unless a sore throat is caused by a bacterial infection, antibiotics won t help you.  Prevent future sore throats  Prevention tips include the following:    Stop smoking or reduce contact with secondhand smoke. Smoke irritates the tender throat lining.    Limit contact with pets and with allergy-causing substances, such as pollen and mold.    When you re around someone with a sore throat or cold, wash your hands often to keep viruses or bacteria  from spreading.    Don t strain your vocal cords.  Call your healthcare provider  Contact your healthcare provider if you have:    A temperature over 101 F (38.3 C)    White spots on the throat    Great difficulty swallowing    Trouble breathing    A skin rash    Recent exposure to someone else with strep bacteria    Severe hoarseness and swollen glands in the neck or jaw   Date Last Reviewed: 8/1/2016 2000-2018 [employer]. [address] [place] [address]. All rights reserved. This information is not intended as a substitute for professional medical care. Always follow your healthcare professional's instructions.

## 2019-10-08 NOTE — PROGRESS NOTES
SUBJECTIVE:   Hayden Nelson is a 38 year old male presenting with a chief complaint of runny nose, stuffy nose, cough - non-productive and cough - productive all resolved now with FB sensation in throat.  Onset of symptoms was 3 week(s) ago.  Course of illness is improving.    Severity moderate  Current and Associated symptoms: feels like something in throat, abnormality of jones apple today more saliva, some burning in chest feels like esophagitis  Treatment measures tried include OTC robitussin DM.  Predisposing factors include None.    Past Medical History:   Diagnosis Date     VIRAL WARTS NOS 12/24/2007     No current outpatient medications on file.     Social History     Tobacco Use     Smoking status: Current Every Day Smoker     Packs/day: 1.50     Years: 14.00     Pack years: 21.00     Types: Cigarettes     Smokeless tobacco: Never Used   Substance Use Topics     Alcohol use: Yes     Comment: 2 beer per year       ROS:  10 point ROS negative except as listed above      OBJECTIVE:  /76   Pulse 82   Temp 96.6  F (35.9  C) (Tympanic)   Wt 146 kg (321 lb 12.8 oz)   SpO2 97%   BMI 45.52 kg/m    GENERAL APPEARANCE: healthy, alert and no distress  EYES: EOMI,  PERRL, conjunctiva clear  HENT: ear canals and TM's normal.  Nose and mouth without ulcers, erythema or lesions  NECK: supple, nontender, no lymphadenopathy  RESP: lungs clear to auscultation - no rales, rhonchi or wheezes  CV: regular rates and rhythm, normal S1 S2, no murmur noted  NEURO: Normal strength and tone, sensory exam grossly normal,  normal speech and mentation  SKIN: no suspicious lesions or rashes    X-ray image initially interpreted in clinic by me in order to rule out foreign body.  No evidence appreciated.    Results for orders placed or performed in visit on 10/08/19   CBC with platelets   Result Value Ref Range    WBC 13.6 (H) 4.0 - 11.0 10e9/L    RBC Count 5.18 4.4 - 5.9 10e12/L    Hemoglobin 15.9 13.3 - 17.7 g/dL     Hematocrit 47.9 40.0 - 53.0 %    MCV 93 78 - 100 fl    MCH 30.7 26.5 - 33.0 pg    MCHC 33.2 31.5 - 36.5 g/dL    RDW 13.1 10.0 - 15.0 %    Platelet Count 230 150 - 450 10e9/L   TSH with free T4 reflex   Result Value Ref Range    TSH 1.64 0.40 - 4.00 mU/L   Strep, Rapid Screen   Result Value Ref Range    Specimen Description Throat     Rapid Strep A Screen       NEGATIVE: No Group A streptococcal antigen detected by immunoassay, await culture report.   Beta strep group A culture   Result Value Ref Range    Specimen Description Throat     Culture Micro No beta hemolytic Streptococcus Group A isolated          ASSESSMENT:  (J02.9) Pharyngitis, unspecified etiology  (primary encounter diagnosis)  Comment: appears to be due to infectious etiology.  History of smoking conncerning, but given URI symptoms likey infectious.  No evidence of abscess  Plan: CBC with platelets, TSH with free T4 reflex, XR        Neck Soft Tissue, Strep, Rapid Screen, Beta         strep group A culture, OTOLARYNGOLOGY REFERRAL      Patient Instructions         With distilled water 2-3x per day for a minimum 2-3 days  Mucinex, increased fluids, humidifier at night, steaming in the day  Cough drops and hot saltwater gargles as needed  RANITIDINE 150 twice daily    Adult Self-Care for Colds  Colds are caused by viruses. They can't be cured with antibiotics. However, you can ease symptoms and support your body's efforts to heal itself.  No matter which symptoms you have, be sure to:    Drink plenty of fluids (water or clear soup)    Stop smoking and drinking alcohol    Get plenty of rest    Understand a fever    Take your temperature several times a day. If your fever is 100.4 F (38.0 C) for more than a day, call your healthcare provider.    Relax, lie down. Go to bed if you want. Just get off your feet and rest. Also, drink plenty of fluids to avoid dehydration.    Take acetaminophen or a nonsteroidal anti-inflammatory agent (NSAID), such as  ibuprofen.  Treat a troubled nose kindly    Breathe steam or heated humidified air to open blocked nasal passages.  a hot shower or use a vaporizer. Be careful not to get burned by the steam.    Saline nasal sprays and decongestant tablets help open a stuffy nose. Antihistamines can also help, but they can cause side effects such as drowsiness and drying of the eyes, nose, and mouth.  Soothe a sore throat and cough    Gargle every 2 hours with 1/4 teaspoon of salt dissolved in 1/2 cup of warm water. Suck on throat lozenges and cough drops to moisten your throat.    Cough medicines are available but it is unclear how well they actually work.    Take acetaminophen or an NSAID, such as ibuprofen, to ease throat pain  Ease digestive problems    Put fluids back into your body. Take frequent sips of clear liquids such as water or broth. Avoid drinks that have a lot of sugar in them, such as juices and sodas. These can make diarrhea worse. Older children and adults can drink sports drinks.    As your appetite returns, you can resume your normal diet. Ask your healthcare provider if there are any foods you should avoid.  When to seek medical care  When you first notice symptoms, ask your healthcare provider if antiviral medicines are appropriate. Antibiotics should not be taken for colds or flu. Also, call your healthcare provider if you have any of the following symptoms or if you aren't feeling better after 7 days:    Shortness of breath    Pain or pressure in the chest or belly (abdomen)    Worsening symptoms, especially after a period of improvement    Fever of 100.4 F  (38.0 C) or higher, or fever that doesn't go down with medicine    Sudden dizziness or confusion    Severe or continued vomiting    Signs of dehydration, including extreme thirst, dark urine, infrequent urination, dry mouth    Spotted, red, or very sore throat   Date Last Reviewed: 12/1/2016 2000-2017 [employer]. [address] [place] [address]  [place] [address]. All rights reserved. This information is not intended as a substitute for professional medical care. Always follow your healthcare professional's instructions.    Patient Education     Viral Upper Respiratory Illness (Adult)    You have a viral upper respiratory illness (URI), which is another term for the common cold. This illness is contagious during the first few days. It is spread through the air by coughing and sneezing. It may also be spread by direct contact (touching the sick person and then touching your own eyes, nose, or mouth). Frequent handwashing will decrease risk of spread. Most viral illnesses go away within 7 to 10 days with rest and simple home remedies. Sometimes the illness may last for several weeks. Antibiotics will not kill a virus, and they are generally not prescribed for this condition.  Home care    If symptoms are severe, rest at home for the first 2 to 3 days. When you resume activity, don't let yourself get too tired.    Don't smoke. If you need help stopping, talk with your healthcare provider.    Avoid being exposed to cigarette smoke (yours or others ).    You may use acetaminophen or ibuprofen to control pain and fever, unless another medicine was prescribed. If you have chronic liver or kidney disease, have ever had a stomach ulcer or gastrointestinal bleeding, or are taking blood-thinning medicines, talk with your healthcare provider before using these medicines. Aspirin should never be given to anyone under 18 years of age who is ill with a viral infection or fever. It may cause severe liver or brain damage.    Your appetite may be poor, so a light diet is fine. Stay well hydrated by drinking 6 to 8 glasses of fluids per day (water, soft drinks, juices, tea, or soup). Extra fluids will help loosen secretions in the nose and lungs.    Over-the-counter cold medicines will not shorten the length of time you re sick, but they may be helpful for the  following symptoms: cough, sore throat, and nasal and sinus congestion. If you take prescription medicines, ask your healthcare provider or pharmacist which over-the-counter medicines are safe to use. (Note: Don't use decongestants if you have high blood pressure.)  Follow-up care  Follow up with your healthcare provider, or as advised.  When to seek medical advice  Call your healthcare provider right away if any of these occur:    Cough with lots of colored sputum (mucus)    Severe headache; face, neck, or ear pain    Difficulty swallowing due to throat pain    Fever of 100.4 F (38 C) or higher, or as directed by your healthcare provider  Call 911  Call 911 if any of these occur:    Chest pain, shortness of breath, wheezing, or difficulty breathing    Coughing up blood    Very severe pain with swallowing, especially if it goes along with a muffled voice   Date Last Reviewed: 6/1/2018 2000-2018 [employer]. [address] [place] [address]. All rights reserved. This information is not intended as a substitute for professional medical care. Always follow your healthcare professional's instructions.           Patient Education     Self-Care for Sore Throats    Sore throats happen for many reasons, such as colds, allergies, and infections caused by viruses or bacteria. In any case, your throat becomes red and sore. Your goal for self-care is to reduce your discomfort while giving your throat a chance to heal.  Moisten and soothe your throat  Tips include the following:    Try a sip of water first thing after waking up.    Keep your throat moist by drinking 6 or more glasses of clear liquids every day.    Run a cool-air humidifier in your room overnight.    Avoid cigarette smoke.     Suck on throat lozenges, cough drops, hard candy, ice chips, or frozen fruit-juice bars. Use the sugar-free versions if your diet or medical condition requires them.  Gargle to ease irritation  Gargling every hour  or 2 can ease irritation. Try gargling with 1 of these solutions:    1/4 teaspoon of salt in 1/2 cup of warm water    An over-the-counter anesthetic gargle  Use medicine for more relief  Over-the-counter medicine can reduce sore throat symptoms. Ask your pharmacist if you have questions about which medicine to use:    Ease pain with anesthetic sprays. Aspirin or an aspirin substitute also helps. Remember, never give aspirin to anyone 18 or younger, or if you are already taking blood thinners.     For sore throats caused by allergies, try antihistamines to block the allergic reaction.    Remember: unless a sore throat is caused by a bacterial infection, antibiotics won t help you.  Prevent future sore throats  Prevention tips include the following:    Stop smoking or reduce contact with secondhand smoke. Smoke irritates the tender throat lining.    Limit contact with pets and with allergy-causing substances, such as pollen and mold.    When you re around someone with a sore throat or cold, wash your hands often to keep viruses or bacteria from spreading.    Don t strain your vocal cords.  Call your healthcare provider  Contact your healthcare provider if you have:    A temperature over 101 F (38.3 C)    White spots on the throat    Great difficulty swallowing    Trouble breathing    A skin rash    Recent exposure to someone else with strep bacteria    Severe hoarseness and swollen glands in the neck or jaw   Date Last Reviewed: 8/1/2016 2000-2018 [employer]. [address] [place] [id number]. All rights reserved. This information is not intended as a substitute for professional medical care. Always follow your healthcare professional's instructions.

## 2019-10-09 LAB
BACTERIA SPEC CULT: NORMAL
SPECIMEN SOURCE: NORMAL

## 2020-06-16 ENCOUNTER — OFFICE VISIT (OUTPATIENT)
Dept: URGENT CARE | Facility: URGENT CARE | Age: 40
End: 2020-06-16
Payer: COMMERCIAL

## 2020-06-16 VITALS
HEART RATE: 83 BPM | DIASTOLIC BLOOD PRESSURE: 78 MMHG | WEIGHT: 307.2 LBS | BODY MASS INDEX: 43.46 KG/M2 | OXYGEN SATURATION: 97 % | TEMPERATURE: 97.5 F | SYSTOLIC BLOOD PRESSURE: 122 MMHG

## 2020-06-16 DIAGNOSIS — Z72.0 TOBACCO ABUSE DISORDER: ICD-10-CM

## 2020-06-16 DIAGNOSIS — M22.2X1 PATELLOFEMORAL DISORDER OF RIGHT KNEE: ICD-10-CM

## 2020-06-16 DIAGNOSIS — S90.822A FRICTION BLISTERS OF SOLE OF LEFT FOOT, INITIAL ENCOUNTER: Primary | ICD-10-CM

## 2020-06-16 DIAGNOSIS — L53.9 ERYTHEMATOUS CONDITION: ICD-10-CM

## 2020-06-16 DIAGNOSIS — E66.01 MORBID OBESITY (H): ICD-10-CM

## 2020-06-16 PROCEDURE — 99214 OFFICE O/P EST MOD 30 MIN: CPT | Performed by: PHYSICIAN ASSISTANT

## 2020-06-16 RX ORDER — SULFAMETHOXAZOLE/TRIMETHOPRIM 800-160 MG
1 TABLET ORAL 2 TIMES DAILY
Qty: 14 TABLET | Refills: 0 | Status: SHIPPED | OUTPATIENT
Start: 2020-06-16 | End: 2020-07-09

## 2020-06-16 NOTE — PATIENT INSTRUCTIONS
Patient Education     Blister (Adult)  A blister is a raised area of skin with clear, watery fluid inside. A blister can occur when the skin is damaged. Blisters can hurt when they are pressed, or if they break open.  Blisters can be caused in many ways. This can happen if the skin is rubbed too hard or often. Or they can occur if the skin is hurt by the sun, a virus, or even a medicine.  Most blisters need little treatment. They often dry up and go away in a few days to weeks after the cause is stopped. A blister may need to be cleaned. A broken (open) blister may be bandaged to prevent infection. Blisters caused by insect bites or drug reactions may be more serious. These should be looked at by a healthcare provider.  Home care  Your healthcare provider may prescribe pain medicine. He or she may also prescribe an antibiotic cream or ointment to put on an open blister. Follow all instructions when using these medicines.  General care:    Follow all instructions on how to care for the blister. If a bandage was put on, change the bandage as instructed. .    If the blister breaks, the area will leak a clear fluid for a day or 2. Wash the area with soap and water every day or as advised by your healthcare provider.    You may use over-the-counter pain medicines to control pain, unless another medicine was prescribed. If you have chronic liver or kidney disease, talk with your healthcare provider before using these medicines. Also talk with your provider if you've had a stomach ulcer or gastrointestinal bleeding.  Follow-up care  Follow up with your healthcare provider, or as advised.  When to seek medical advice  Call your healthcare provider right away if any of these occur:    Fever of 100.4 F (38 C) or higher    Redness or swelling that is new or gets worse    Foul-smelling fluid leaking from the blister    Pain doesn t go away, or gets worse    Increase in size of the blister    Blister doesn t get better after  several days  Date Last Reviewed: 9/1/2016 2000-2019 [employer]. [address] [place] [address], [place] [address]. All rights reserved. This information is not intended as a substitute for professional medical care. Always follow your healthcare professional's instructions.           Patient Education     Cellulitis  Cellulitis is an infection of the deep layers of skin. A break in the skin, such as a cut or scratch, can let bacteria under the skin. If the bacteria get to deep layers of the skin, it can be serious. If not treated, cellulitis can get into the bloodstream and lymph nodes. The infection can then spread throughout the body. This causes serious illness.  Cellulitis causes the affected skin to become red, swollen, warm, and sore. The reddened areas have a visible border. An open sore may leak fluid (pus). You may have a fever, chills, and pain.  Cellulitis is treated with antibiotics taken for 7 to 10 days. An open sore may be cleaned and covered with cool wet gauze. Symptoms should get better 1 to 2 days after treatment is started. Make sure to take all the antibiotics for the full number of days until they are gone. Keep taking the medicine even if your symptoms go away.  Home care  Follow these tips:    Limit the use of the part of your body with cellulitis.     If the infection is on your leg, keep your leg raised while sitting. This will help to reduce swelling.    Take all of the antibiotic medicine exactly as directed until it is gone. Do not miss any doses, especially during the first 7 days. Don t stop taking the medicine when your symptoms get better.    Keep the affected area clean and dry.    Wash your hands with soap and warm water before and after touching your skin. Anyone else who touches your skin should also wash his or her hands. Don't share towels.  Follow-up care  Follow up with your healthcare provider, or as advised. If your infection does not go away on the first  antibiotic, your healthcare provider will prescribe a different one.  When to seek medical advice  Call your healthcare provider right away if any of these occur:    Red areas that spread    Swelling or pain that gets worse    Fluid leaking from the skin (pus)    Fever higher of 100.4  F (38.0  C) or higher after 2 days on antibiotics  Date Last Reviewed: 9/1/2016 2000-2019 [employer]. [address] [place] [address]. All rights reserved. This information is not intended as a substitute for professional medical care. Always follow your healthcare professional's instructions.           Patient Education     Understanding Patellofemoral Syndrome    Patellofemoral syndrome is a condition that causes pain on the front of the knee. The large bones of the upper and lower leg meet at the knee. This joint also includes a small triangle-shaped bone that rests on top of the leg bones. This is the kneecap (patella). Patellofemoral refers to the patella and the thigh bone (femur). These bones are surrounded by connective tissue and muscles. Patellofemoral pain is believed to come from stress on the tissues of and around the knee.  What causes patellofemoral syndrome?  No single cause for patellofemoral pain has been found. But many things are likely to contribute to this type of knee pain. These include:    Actions that put repeated stress on the knee, such as running and squatting    Overtraining at a sport    Weak hip or thigh muscle    Normal variations in the way body parts fit together    Poor form during activities that stress the knee, such as running    A fall or blow to the knee  Symptoms of patellofemoral syndrome  Pain is a common symptom. It s often on the front of the knee, but can be around the kneecap. Pain can occur at certain times, such as when you are:    Running    Sitting for a long time with your knees bent, such as at a movie    Walking up or down stairs    Squatting  Other  symptoms may include:    A feeling of the knee catching or locking    A grinding or crackling noise in your knee  Treatment for patellofemoral syndrome  Treatment focuses on reducing pain and avoiding further injury. Treatments may include:    Rest your leg. This gives your knee time to recover. You may need to avoid or change the activity that caused the problem, such as not running for a while.    Prescription or over-the-counter pain medicines. These help reduce inflammation, swelling, and pain.    Cold packs. These help reduce pain.    Stretches and other exercises. These can improve balance, flexibility, and strength.    A shoe insert (orthotic). This can make your knee more stable.    Elastic tape or a brace. These can make your knee more stable.    Physical therapy. This may include exercises or other treatments.    Surgery. In rare cases, if other treatments don t relieve symptoms, you may need surgery.  Possible complications of patellofemoral syndrome  If you don t give your knee time to heal, symptoms may return or get worse. Follow your healthcare provider s instructions on resting and treating your knee.  When to call your healthcare provider  Call your healthcare provider right away if you have any of these:    Fever of 100.4 F (38 C) or higher, or as directed    Pain that gets worse    Symptoms that don t get better, or get worse    New symptoms   Date Last Reviewed: 3/10/2016    3825-9338 [employer]. [address] [place] [address], [place] [address]. All rights reserved. This information is not intended as a substitute for professional medical care. Always follow your healthcare professional's instructions.

## 2020-06-22 NOTE — PROGRESS NOTES
SUBJECTIVE:  Chief Complaint   Patient presents with     knee and foot pain     Hayden PERFECTO Nelson is a 39 year old male presents with a chief complaint orash on left foot and pain in right knee.  The injury occurred 2 week(s) ago.    How: likely from ill footing boots.  The patient complained of mild pain  and has not had decreased ROM.  Has adjusted gait due to blisters on sole of foot, now right knee hurts.  No swelling, heat, redness.  Pain exacerbated by weight-bearing.  Relieved by rest.  He treated it initially rash by puncturing the blisters. This is the first time this type of injury has occurred to this patient.     Past Medical History:   Diagnosis Date     VIRAL WARTS NOS 12/24/2007     Current Outpatient Medications   Medication Sig Dispense Refill     order for DME Crutches 1 each 0     sulfamethoxazole-trimethoprim (BACTRIM DS) 800-160 MG tablet Take 1 tablet by mouth 2 times daily for 7 days 14 tablet 0     Social History     Tobacco Use     Smoking status: Current Every Day Smoker     Packs/day: 1.50     Years: 14.00     Pack years: 21.00     Types: Cigarettes     Smokeless tobacco: Never Used   Substance Use Topics     Alcohol use: Yes     Comment: 2 beer per year       ROS:  Review of systems negative except as stated above.    EXAM:   /78   Pulse 83   Temp 97.5  F (36.4  C)   Wt 139.3 kg (307 lb 3.2 oz)   SpO2 97%   BMI 43.46 kg/m    Gen: healthy,alert,no distress  Foot: 2 large drained blisters with minimal erythema  Knee: point tender at patellofemoral tendon, no swelling  CHEST: clear to auscultation  CV: regular rate and rhythm  EXTREMITIES: peripheral pulses normal  NEURO: Normal strength and tone, sensory exam grossly normal, mentation intact and speech normal    X-ray image initially interpreted in clinic by me in order to rule out fracture/dislocation.  No evidence appreciated.    ASSESSMENT:   (S19.274H) Friction blisters of sole of left foot, initial encounter  (primary  encounter diagnosis)  Plan: order for DME, sulfamethoxazole-trimethoprim         (BACTRIM DS) 800-160 MG tablet      (E66.01) Morbid obesity (H)  (M22.2X1) Patellofemoral disorder of right knee  (L53.9) Erythematous condition  (Z72.0) Tobacco abuse disorder  Comment: covering for bacteria given incision and drainage performed at home  Plan: sulfamethoxazole-trimethoprim (BACTRIM DS)         800-160 MG tablet      Follow up with PCP if symptoms worsen or fail to improve      Patient Instructions       Patient Education     Blister (Adult)  A blister is a raised area of skin with clear, watery fluid inside. A blister can occur when the skin is damaged. Blisters can hurt when they are pressed, or if they break open.  Blisters can be caused in many ways. This can happen if the skin is rubbed too hard or often. Or they can occur if the skin is hurt by the sun, a virus, or even a medicine.  Most blisters need little treatment. They often dry up and go away in a few days to weeks after the cause is stopped. A blister may need to be cleaned. A broken (open) blister may be bandaged to prevent infection. Blisters caused by insect bites or drug reactions may be more serious. These should be looked at by a healthcare provider.  Home care  Your healthcare provider may prescribe pain medicine. He or she may also prescribe an antibiotic cream or ointment to put on an open blister. Follow all instructions when using these medicines.  General care:    Follow all instructions on how to care for the blister. If a bandage was put on, change the bandage as instructed. .    If the blister breaks, the area will leak a clear fluid for a day or 2. Wash the area with soap and water every day or as advised by your healthcare provider.    You may use over-the-counter pain medicines to control pain, unless another medicine was prescribed. If you have chronic liver or kidney disease, talk with your healthcare provider before using these  medicines. Also talk with your provider if you've had a stomach ulcer or gastrointestinal bleeding.  Follow-up care  Follow up with your healthcare provider, or as advised.  When to seek medical advice  Call your healthcare provider right away if any of these occur:    Fever of 100.4 F (38 C) or higher    Redness or swelling that is new or gets worse    Foul-smelling fluid leaking from the blister    Pain doesn t go away, or gets worse    Increase in size of the blister    Blister doesn t get better after several days  Date Last Reviewed: 9/1/2016 2000-2019 [employer]. [address] [place] [address]. All rights reserved. This information is not intended as a substitute for professional medical care. Always follow your healthcare professional's instructions.           Patient Education     Cellulitis  Cellulitis is an infection of the deep layers of skin. A break in the skin, such as a cut or scratch, can let bacteria under the skin. If the bacteria get to deep layers of the skin, it can be serious. If not treated, cellulitis can get into the bloodstream and lymph nodes. The infection can then spread throughout the body. This causes serious illness.  Cellulitis causes the affected skin to become red, swollen, warm, and sore. The reddened areas have a visible border. An open sore may leak fluid (pus). You may have a fever, chills, and pain.  Cellulitis is treated with antibiotics taken for 7 to 10 days. An open sore may be cleaned and covered with cool wet gauze. Symptoms should get better 1 to 2 days after treatment is started. Make sure to take all the antibiotics for the full number of days until they are gone. Keep taking the medicine even if your symptoms go away.  Home care  Follow these tips:    Limit the use of the part of your body with cellulitis.     If the infection is on your leg, keep your leg raised while sitting. This will help to reduce swelling.    Take all of the  antibiotic medicine exactly as directed until it is gone. Do not miss any doses, especially during the first 7 days. Don t stop taking the medicine when your symptoms get better.    Keep the affected area clean and dry.    Wash your hands with soap and warm water before and after touching your skin. Anyone else who touches your skin should also wash his or her hands. Don't share towels.  Follow-up care  Follow up with your healthcare provider, or as advised. If your infection does not go away on the first antibiotic, your healthcare provider will prescribe a different one.  When to seek medical advice  Call your healthcare provider right away if any of these occur:    Red areas that spread    Swelling or pain that gets worse    Fluid leaking from the skin (pus)    Fever higher of 100.4  F (38.0  C) or higher after 2 days on antibiotics  Date Last Reviewed: 9/1/2016 2000-2019 [employer]. [address] [place] [address]. All rights reserved. This information is not intended as a substitute for professional medical care. Always follow your healthcare professional's instructions.           Patient Education     Understanding Patellofemoral Syndrome    Patellofemoral syndrome is a condition that causes pain on the front of the knee. The large bones of the upper and lower leg meet at the knee. This joint also includes a small triangle-shaped bone that rests on top of the leg bones. This is the kneecap (patella). Patellofemoral refers to the patella and the thigh bone (femur). These bones are surrounded by connective tissue and muscles. Patellofemoral pain is believed to come from stress on the tissues of and around the knee.  What causes patellofemoral syndrome?  No single cause for patellofemoral pain has been found. But many things are likely to contribute to this type of knee pain. These include:    Actions that put repeated stress on the knee, such as running and squatting    Overtraining  at a sport    Weak hip or thigh muscle    Normal variations in the way body parts fit together    Poor form during activities that stress the knee, such as running    A fall or blow to the knee  Symptoms of patellofemoral syndrome  Pain is a common symptom. It s often on the front of the knee, but can be around the kneecap. Pain can occur at certain times, such as when you are:    Running    Sitting for a long time with your knees bent, such as at a movie    Walking up or down stairs    Squatting  Other symptoms may include:    A feeling of the knee catching or locking    A grinding or crackling noise in your knee  Treatment for patellofemoral syndrome  Treatment focuses on reducing pain and avoiding further injury. Treatments may include:    Rest your leg. This gives your knee time to recover. You may need to avoid or change the activity that caused the problem, such as not running for a while.    Prescription or over-the-counter pain medicines. These help reduce inflammation, swelling, and pain.    Cold packs. These help reduce pain.    Stretches and other exercises. These can improve balance, flexibility, and strength.    A shoe insert (orthotic). This can make your knee more stable.    Elastic tape or a brace. These can make your knee more stable.    Physical therapy. This may include exercises or other treatments.    Surgery. In rare cases, if other treatments don t relieve symptoms, you may need surgery.  Possible complications of patellofemoral syndrome  If you don t give your knee time to heal, symptoms may return or get worse. Follow your healthcare provider s instructions on resting and treating your knee.  When to call your healthcare provider  Call your healthcare provider right away if you have any of these:    Fever of 100.4 F (38 C) or higher, or as directed    Pain that gets worse    Symptoms that don t get better, or get worse    New symptoms   Date Last Reviewed: 3/10/2016    2490-4653 [employer] [address]. [address] [place] [address], [place] [address]. All rights reserved. This information is not intended as a substitute for professional medical care. Always follow your healthcare professional's instructions.

## 2020-06-25 ENCOUNTER — TRANSFERRED RECORDS (OUTPATIENT)
Dept: HEALTH INFORMATION MANAGEMENT | Facility: CLINIC | Age: 40
End: 2020-06-25

## 2020-07-09 ENCOUNTER — OFFICE VISIT (OUTPATIENT)
Dept: PODIATRY | Facility: CLINIC | Age: 40
End: 2020-07-09
Payer: COMMERCIAL

## 2020-07-09 VITALS
BODY MASS INDEX: 41.58 KG/M2 | DIASTOLIC BLOOD PRESSURE: 70 MMHG | SYSTOLIC BLOOD PRESSURE: 118 MMHG | WEIGHT: 307 LBS | HEIGHT: 72 IN

## 2020-07-09 DIAGNOSIS — L85.3 DRY SKIN: Primary | ICD-10-CM

## 2020-07-09 DIAGNOSIS — L98.9 SKIN ABNORMALITY: ICD-10-CM

## 2020-07-09 PROCEDURE — 99203 OFFICE O/P NEW LOW 30 MIN: CPT | Performed by: PODIATRIST

## 2020-07-09 RX ORDER — AMMONIUM LACTATE 12 G/100G
CREAM TOPICAL 2 TIMES DAILY
Qty: 385 G | Refills: 1 | Status: SHIPPED | OUTPATIENT
Start: 2020-07-09 | End: 2021-07-16

## 2020-07-09 ASSESSMENT — MIFFLIN-ST. JEOR: SCORE: 2345.54

## 2020-07-09 NOTE — PATIENT INSTRUCTIONS
Thank you for choosing [facility] Podiatry / Foot & Ankle Surgery!    [place] [facility] [place] SCHEDULE SURGERY: [phone]   [address] BILLING QUESTIONS: [phone]   [place] [address] AFTER HOURS: [phone]   PH: [phone] CON[employer]ER [place] LINE:[phone]   FAX: [phone] APPOINTMENTS: [phone]     Lachydrine cream sent to pharmacy    SIGNS OF INFECTION    expanding redness around the wound     yellow or greenish-colored pus or cloudy wound drainage     red streaking spreading from the wound     increased swelling, tenderness, or pain around the wound     fever  *If you notice any of these signs of infection, call us right away!

## 2020-07-09 NOTE — PROGRESS NOTES
ASSESSMENT/PLAN:    Encounter Diagnoses   Name Primary?     Dry skin Yes     Skin abnormality      I am not sure what caused his left foot plantar blisters.  Friction blisters cannot be ruled out but I agree the distribution is not typical.  Per Hayden's report, his dermatologist tested for fungus and also did a skin biopsy.  No significant findings.  I question if he had some sort of contact dermatitis, reaction to something he stepped in.    I prescribed Lac-Hydrin 12% cream for the dry hyperkeratotic skin.  I do not have much more to offer at this time regarding the blisters.  If they recur, I encouraged him to follow-up with podiatry face-to-face.  He is good taking pictures with his phone and a virtual visit would be another option.      Body mass index is 41.64 kg/m .    Weight management plan: Patient was referred to their PCP to discuss a diet and exercise plan.      Jeramie Baker DPM, FACFAS, MS    [facility] Department of Podiatry/Foot & Ankle Surgery      ____________________________________________________________________    HPI:         Chief Complaint: blisters recurring on left foot   Onset of problem: 1 month  Pain/ discomfort is described as:  annoying  Ratin/10   Frequency:  daily    The pain is made worse with walking  Previous treatment: urgent care - antibiotic    He reports that these blisters formed after increased activity on his feet, but they did not seem consistent with friction blister.  His dermatologist agreed.  He has applied Lamisil cream and a moisturizer. He explained that his dermatologist tested for fungus and took a skin biopsy. No findings to explain the blisters.  He denies any significant foot swelling.     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 He does not remember stepping in anything.     *  Patient Active Problem List   Diagnosis     Obesity     Hypertrophic and atrophic condition of skin     Chronic  rhinitis     Epistaxis     Fatty liver     Elevated liver function tests     Plantar fasciitis     CARDIOVASCULAR SCREENING; LDL GOAL LESS THAN 160     Obstructive sleep apnea     Central sleep apnea     GERD (gastroesophageal reflux disease)     Morbid obesity (H)   *  *  Past Surgical History:   Procedure Laterality Date     HC REPAIR OF NASAL SEPTUM  7/30/09   *  *  Current Outpatient Medications   Medication Sig Dispense Refill     order for DME Crutches (Patient not taking: Reported on 7/9/2020) 1 each 0       ROS:     A 10-point review of systems was performed and is positive for that noted above in the HPI and as seen below.  All other areas are negative.     Numbness in feet?  no   Calf pain with walking? no  Recent foot/ankle injury? no  Weight change over past 12 months? no  Self perception as overweight? yes  Recent flu-like symptoms? no  Joint pain other than feet ? no    Social History: Employment:  yes;  Exercise/Physical activity:  no;  Tobacco use:  yes  Social History     Socioeconomic History     Marital status:      Spouse name: Elo     Number of children: 2     Years of education: 12     Highest education level: Not on file   Occupational History     Employer: SELF   Social Needs     Financial resource strain: Not on file     Food insecurity     Worry: Not on file     Inability: Not on file     Transportation needs     Medical: Not on file     Non-medical: Not on file   Tobacco Use     Smoking status: Current Every Day Smoker     Packs/day: 1.50     Years: 14.00     Pack years: 21.00     Types: Cigarettes     Smokeless tobacco: Never Used   Substance and Sexual Activity     Alcohol use: Yes     Comment: 2 beer per year     Drug use: No     Sexual activity: Yes     Partners: Female     Birth control/protection: Pill   Lifestyle     Physical activity     Days per week: Not on file     Minutes per session: Not on file     Stress: Not on file   Relationships     Social connections      "Talks on phone: Not on file     Gets together: Not on file     Attends Episcopalian service: Not on file     Active member of club or organization: Not on file     Attends meetings of clubs or organizations: Not on file     Relationship status: Not on file     Intimate partner violence     Fear of current or ex partner: Not on file     Emotionally abused: Not on file     Physically abused: Not on file     Forced sexual activity: Not on file   Other Topics Concern     Parent/sibling w/ CABG, MI or angioplasty before 65F 55M? Not Asked   Social History Narrative     Not on file       Family history:  Family History   Problem Relation Age of Onset     Cancer Father         Lung and brain cancer     Diabetes Maternal Grandmother         Type 1     Gastrointestinal Disease Paternal Aunt         Celiac       Rheumatoid arthritis:  yes  Foot Problems: no  Diabetes: grandparent      EXAM:    Vitals: /70   Ht 1.829 m (6')   Wt 139.3 kg (307 lb)   BMI 41.64 kg/m    BMI: Body mass index is 41.64 kg/m .  Height: 6' 0\"    Constitutional/ general:  Pt is in no apparent distress, appears well-nourished.  Cooperative with history and physical exam.     Vascular:  Pedal pulses are palpable bilaterally for both the DP and PT arteries.  CFT < 3 sec.  No edema.  Pedal hair growth noted.     Neuro:  Alert and oriented x 3. Coordinated gait.  Light touch sensation is intact to the L4, L5, S1 distributions. No obvious deficits.  No evidence of neurological-based weakness, spasticity, or contracture in the lower extremities.     Derm: Normal texture and turgor.  No erythema, ecchymosis, or cyanosis.  No open lesions.     There area patches of dried skin and adali of loss of skin (to deeper skin) on the plantar left forefoot. No blisters. No erythema or edema.     Musculoskeletal:    Lower extremity muscle strength is normal.  Patient is ambulatory without an assistive device or brace .  No gross deformities.            "

## 2020-07-09 NOTE — LETTER
2020         RE: Hayden Nelson  [address] [place], [place] [address] [address]        Dear Colleague,    Thank you for referring your patient, Hayden Nelson, to the [facility] PODIATRY. Please see a copy of my visit note below.      ASSESSMENT/PLAN:    Encounter Diagnoses   Name Primary?     Dry skin Yes     Skin abnormality      I am not sure what caused his left foot plantar blisters.  Friction blisters cannot be ruled out but I agree the distribution is not typical.  Per Hayden's report, his dermatologist tested for fungus and also did a skin biopsy.  No significant findings.  I question if he had some sort of contact dermatitis, reaction to something he stepped in.    I prescribed Lac-Hydrin 12% cream for the dry hyperkeratotic skin.  I do not have much more to offer at this time regarding the blisters.  If they recur, I encouraged him to follow-up with podiatry face-to-face.  He is good taking pictures with his phone and a virtual visit would be another option.      Body mass index is 41.64 kg/m .    Weight management plan: Patient was referred to their PCP to discuss a diet and exercise plan.      Jeramie Baker DPM, FACFAS, MS    [place] Department of Podiatry/Foot & Ankle Surgery      ____________________________________________________________________    HPI:         Chief Complaint: blisters recurring on left foot   Onset of problem: 1 month  Pain/ discomfort is described as:  annoying  Ratin/10   Frequency:  daily    The pain is made worse with walking  Previous treatment: urgent care - antibiotic    He reports that these blisters formed after increased activity on his feet, but they did not seem consistent with friction blister.  His dermatologist agreed.  He has applied Lamisil cream and a moisturizer. He explained that his dermatologist tested for fungus and took a skin biopsy. No findings to explain the blisters.  He denies any significant foot swelling.     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 He does not remember stepping in anything.     *  Patient Active Problem List   Diagnosis     Obesity     Hypertrophic and atrophic condition of skin     Chronic rhinitis     Epistaxis     Fatty liver     Elevated liver function tests     Plantar fasciitis     CARDIOVASCULAR SCREENING; LDL GOAL LESS THAN 160     Obstructive sleep apnea     Central sleep apnea     GERD (gastroesophageal reflux disease)     Morbid obesity (H)   *  *  Past Surgical History:   Procedure Laterality Date     HC REPAIR OF NASAL SEPTUM  7/30/09   *  *  Current Outpatient Medications   Medication Sig Dispense Refill     order for DME Crutches (Patient not taking: Reported on 7/9/2020) 1 each 0       ROS:     A 10-point review of systems was performed and is positive for that noted above in the HPI and as seen below.  All other areas are negative.     Numbness in feet?  no   Calf pain with walking? no  Recent foot/ankle injury? no  Weight change over past 12 months? no  Self perception as overweight? yes  Recent flu-like symptoms? no  Joint pain other than feet ? no    Social History: Employment:  yes;  Exercise/Physical activity:  no;  Tobacco use:  yes  Social History     Socioeconomic History     Marital status:      Spouse name: Elo     Number of children: 2     Years of education: 12     Highest education level: Not on file   Occupational History     Employer: SELF   Social Needs     Financial resource strain: Not on file     Food insecurity     Worry: Not on file     Inability: Not on file     Transportation needs     Medical: Not on file     Non-medical: Not on file   Tobacco Use     Smoking status: Current Every Day Smoker     Packs/day: 1.50     Years: 14.00     Pack years: 21.00     Types: Cigarettes     Smokeless tobacco: Never Used   Substance and Sexual Activity     Alcohol use: Yes     Comment: 2 beer per year     Drug use: No     Sexual activity:  "Yes     Partners: Female     Birth control/protection: Pill   Lifestyle     Physical activity     Days per week: Not on file     Minutes per session: Not on file     Stress: Not on file   Relationships     Social connections     Talks on phone: Not on file     Gets together: Not on file     Attends Moravian service: Not on file     Active member of club or organization: Not on file     Attends meetings of clubs or organizations: Not on file     Relationship status: Not on file     Intimate partner violence     Fear of current or ex partner: Not on file     Emotionally abused: Not on file     Physically abused: Not on file     Forced sexual activity: Not on file   Other Topics Concern     Parent/sibling w/ CABG, MI or angioplasty before 65F 55M? Not Asked   Social History Narrative     Not on file       Family history:  Family History   Problem Relation Age of Onset     Cancer Father         Lung and brain cancer     Diabetes Maternal Grandmother         Type 1     Gastrointestinal Disease Paternal Aunt         Celiac       Rheumatoid arthritis:  yes  Foot Problems: no  Diabetes: grandparent      EXAM:    Vitals: /70   Ht 1.829 m (6')   Wt 139.3 kg (307 lb)   BMI 41.64 kg/m    BMI: Body mass index is 41.64 kg/m .  Height: 6' 0\"    Constitutional/ general:  Pt is in no apparent distress, appears well-nourished.  Cooperative with history and physical exam.     Vascular:  Pedal pulses are palpable bilaterally for both the DP and PT arteries.  CFT < 3 sec.  No edema.  Pedal hair growth noted.     Neuro:  Alert and oriented x 3. Coordinated gait.  Light touch sensation is intact to the L4, L5, S1 distributions. No obvious deficits.  No evidence of neurological-based weakness, spasticity, or contracture in the lower extremities.     Derm: Normal texture and turgor.  No erythema, ecchymosis, or cyanosis.  No open lesions.     There area patches of dried skin and adali of loss of skin (to deeper skin) on the plantar " left forefoot. No blisters. No erythema or edema.     Musculoskeletal:    Lower extremity muscle strength is normal.  Patient is ambulatory without an assistive device or brace .  No gross deformities.              Again, thank you for allowing me to participate in the care of your patient.        Sincerely,        Jeramie Baker DPM

## 2020-07-15 ENCOUNTER — TRANSFERRED RECORDS (OUTPATIENT)
Dept: HEALTH INFORMATION MANAGEMENT | Facility: CLINIC | Age: 40
End: 2020-07-15

## 2021-01-15 ENCOUNTER — HEALTH MAINTENANCE LETTER (OUTPATIENT)
Age: 41
End: 2021-01-15

## 2021-07-16 DIAGNOSIS — L85.3 DRY SKIN: ICD-10-CM

## 2021-07-16 DIAGNOSIS — L98.9 SKIN ABNORMALITY: ICD-10-CM

## 2021-07-16 RX ORDER — AMMONIUM LACTATE 12 G/100G
CREAM TOPICAL 2 TIMES DAILY
Qty: 385 G | Refills: 1 | Status: SHIPPED | OUTPATIENT
Start: 2021-07-16

## 2021-07-16 NOTE — TELEPHONE ENCOUNTER
Routing refill request to provider for review/approval because:  Patient needs to be seen per protocol.       Leora Dillon RN  [facility]

## 2021-09-04 ENCOUNTER — HEALTH MAINTENANCE LETTER (OUTPATIENT)
Age: 41
End: 2021-09-04

## 2021-10-13 ENCOUNTER — HOSPITAL ENCOUNTER (EMERGENCY)
Facility: CLINIC | Age: 41
Discharge: HOME | End: 2021-10-13
Attending: EMERGENCY MEDICINE | Admitting: EMERGENCY MEDICINE
Payer: COMMERCIAL

## 2021-10-13 VITALS
BODY MASS INDEX: 42.26 KG/M2 | TEMPERATURE: 97.5 F | RESPIRATION RATE: 18 BRPM | HEART RATE: 80 BPM | OXYGEN SATURATION: 98 % | DIASTOLIC BLOOD PRESSURE: 96 MMHG | SYSTOLIC BLOOD PRESSURE: 152 MMHG | HEIGHT: 72 IN | WEIGHT: 312 LBS

## 2021-10-13 DIAGNOSIS — K08.89 PAIN, DENTAL: ICD-10-CM

## 2021-10-13 PROCEDURE — 64400 NJX AA&/STRD TRIGEMINAL NRV: CPT

## 2021-10-13 PROCEDURE — 250N000013 HC RX MED GY IP 250 OP 250 PS 637: Performed by: EMERGENCY MEDICINE

## 2021-10-13 PROCEDURE — 99283 EMERGENCY DEPT VISIT LOW MDM: CPT | Mod: 25

## 2021-10-13 RX ORDER — OXYCODONE HYDROCHLORIDE 5 MG/1
5 TABLET ORAL ONCE
Status: COMPLETED | OUTPATIENT
Start: 2021-10-13 | End: 2021-10-13

## 2021-10-13 RX ORDER — BUPIVACAINE HYDROCHLORIDE AND EPINEPHRINE 5; 5 MG/ML; UG/ML
INJECTION, SOLUTION PERINEURAL
Status: DISCONTINUED
Start: 2021-10-13 | End: 2021-10-13 | Stop reason: HOSPADM

## 2021-10-13 RX ORDER — OXYCODONE HYDROCHLORIDE 5 MG/1
5 TABLET ORAL EVERY 6 HOURS PRN
Qty: 10 TABLET | Refills: 0 | Status: SHIPPED | OUTPATIENT
Start: 2021-10-13 | End: 2021-10-16

## 2021-10-13 RX ORDER — CLINDAMYCIN HCL 300 MG
300 CAPSULE ORAL 4 TIMES DAILY
Qty: 40 CAPSULE | Refills: 0 | Status: SHIPPED | OUTPATIENT
Start: 2021-10-13 | End: 2021-10-23

## 2021-10-13 RX ORDER — IBUPROFEN 600 MG/1
600 TABLET, FILM COATED ORAL EVERY 6 HOURS PRN
Qty: 30 TABLET | Refills: 1 | Status: SHIPPED | OUTPATIENT
Start: 2021-10-13

## 2021-10-13 RX ADMIN — OXYCODONE HYDROCHLORIDE 5 MG: 5 TABLET ORAL at 05:38

## 2021-10-13 ASSESSMENT — MIFFLIN-ST. JEOR: SCORE: 2363.22

## 2021-10-13 NOTE — ED PROVIDER NOTES
"  History     Chief Complaint:  Dental Pain     The history is provided by the patient.      Hayden Nelson is a 40 year old male with history of obesity who presents with over 24 hours of \"throbbing\" dental pain on the right side of his mouth. He reports that he is having pain from one right lower tooth and one right upper tooth; lower tooth is worse than the upper tooth. He has tried Tylenol, ibuprofen, and dental numbing gel without relief. Most recent Tylenol was taken at 2330 last night and most recent ibuprofen at 2000. The pain impacted his sleep last night, which prompts his presentation this morning. He states he has been unable to get a dentist appointment scheduled.    Review of Systems   HENT: Positive for dental problem.    All other systems reviewed and are negative.    Allergies:  Carafate  Omeprazole  Latex    Medications:  The patient is currently on no regular medications.    Past Medical History:     Obesity  Chronic rhinitis  Fatty liver  Elevated LFTs  Plantar fasciitis  GERD    Past Surgical History:    Septoplasty     Family History:    Father: lung cancer, brain cancer  Mother: heart disease    Social History:  Presents with his wife.  Presents via car.    Physical Exam     Patient Vitals for the past 24 hrs:   BP Temp Temp src Pulse Resp SpO2 Height Weight   10/13/21 0320 (!) 152/96 97.5  F (36.4  C) Temporal 80 18 98 % 1.829 m (6') 141.5 kg (312 lb)       Physical Exam  Vitals and nursing note reviewed.   HENT:      Head: Normocephalic.      Nose: Nose normal.      Mouth/Throat:      Comments: He has very poor dentition with multiple decayed teeth patient describes pain in the right lower mandible as well as left upper and right upper maxilla.  No gingival abscess no gingival swelling.  Cardiovascular:      Rate and Rhythm: Normal rate.   Pulmonary:      Effort: Pulmonary effort is normal.   Neurological:      Mental Status: He is alert.         Emergency Department Course "     Procedures    Dental Block     INDICATIONS:  Dental Pain    LOCATION:  Right inferior alveolar  ANESTHESIA: Regional block using 0.5% bupivacaine with epinephrine, total of 1.8 mLs   PROCEDURE NOTE: The patient tolerated the procedure well with good relief of discomfort and there were no complications.    Emergency Department Course:    Reviewed:  I reviewed nursing notes, vitals, past medical history and Care Everywhere    Assessments:  0524 I obtained history and examined the patient as noted above.   0530 I rechecked the patient and performed the dental block.   0606 I rechecked the patient.    Interventions:  0538 Roxicodone 5 mg PO    Disposition:  The patient was discharged to home.     Impression & Plan     Medical Decision Making:  Patient presents with dental pain.  Examination is suspicious for dental caries no signs of gingival abscess.  Patient was advised the emergency room is a temporary treatment for dental problems and recommend follow-up with a dentist.  Patient was offered a dental block due to severe pain in the right lower jaw.  Patient was offered antibiotic and medication for pain as a bridge to follow-up with primary care.      Diagnosis:    ICD-10-CM    1. Pain, dental  K08.89        Discharge Medications:  Discharge Medication List as of 10/13/2021  6:10 AM      START taking these medications    Details   clindamycin (CLEOCIN) 300 MG capsule Take 1 capsule (300 mg) by mouth 4 times daily for 10 days, Disp-40 capsule, R-0, Local Print      ibuprofen (ADVIL/MOTRIN) 600 MG tablet Take 1 tablet (600 mg) by mouth every 6 hours as needed for moderate pain, Disp-30 tablet, R-1, Local Print      oxyCODONE (ROXICODONE) 5 MG tablet Take 1 tablet (5 mg) by mouth every 6 hours as needed for pain, Disp-10 tablet, R-0, Local Print             Scribe Disclosure:  Octavia MONTE, am serving as a scribe at 5:20 AM on 10/13/2021 to document services personally performed by Noé Tilley MD based  on my observations and the provider's statements to me.      Noé Tilley MD  10/15/21 4504     19-Sep-2018 10:33

## 2021-10-13 NOTE — DISCHARGE INSTRUCTIONS
As we have did discuss the emergency room can try and help with pain and start antibiotics for the concerns of infection of teeth but do not have x-rays to perform for teeth nor can we drill or fill.  Please follow-up with a dentist as the emergency room cannot refill pain medications or ongoingly manage dental problems.  Use medication for pain as needed continue ibuprofen for anti-inflammatory.  Start antibiotics for the concern of infection of the tooth.

## 2021-10-13 NOTE — ED TRIAGE NOTES
Here for dental pain to right lower tooth started yesterday night. Try tylenol, ibuprofen, and orogel, but not helping. Last Tylenol at 11:30pm, last ibuprofen at 8pm. Unable to get into dentist. ABCs intact.

## 2021-12-06 ENCOUNTER — TRANSFERRED RECORDS (OUTPATIENT)
Dept: HEALTH INFORMATION MANAGEMENT | Facility: CLINIC | Age: 41
End: 2021-12-06
Payer: COMMERCIAL

## 2022-01-31 ENCOUNTER — OFFICE VISIT (OUTPATIENT)
Dept: URGENT CARE | Facility: URGENT CARE | Age: 42
End: 2022-01-31
Payer: COMMERCIAL

## 2022-01-31 VITALS
HEART RATE: 108 BPM | OXYGEN SATURATION: 95 % | TEMPERATURE: 98.1 F | BODY MASS INDEX: 38.25 KG/M2 | DIASTOLIC BLOOD PRESSURE: 62 MMHG | SYSTOLIC BLOOD PRESSURE: 134 MMHG | RESPIRATION RATE: 16 BRPM | WEIGHT: 282 LBS

## 2022-01-31 DIAGNOSIS — R35.0 URINARY FREQUENCY: Primary | ICD-10-CM

## 2022-01-31 LAB
ALBUMIN UR-MCNC: NEGATIVE MG/DL
APPEARANCE UR: CLEAR
BILIRUB UR QL STRIP: NEGATIVE
COLOR UR AUTO: YELLOW
GLUCOSE UR STRIP-MCNC: NEGATIVE MG/DL
HGB UR QL STRIP: ABNORMAL
KETONES UR STRIP-MCNC: NEGATIVE MG/DL
LEUKOCYTE ESTERASE UR QL STRIP: NEGATIVE
NITRATE UR QL: NEGATIVE
PH UR STRIP: 6 [PH] (ref 5–7)
RBC #/AREA URNS AUTO: ABNORMAL /HPF
SP GR UR STRIP: 1.01 (ref 1–1.03)
SQUAMOUS #/AREA URNS AUTO: ABNORMAL /LPF
UROBILINOGEN UR STRIP-ACNC: 0.2 E.U./DL
WBC #/AREA URNS AUTO: ABNORMAL /HPF

## 2022-01-31 PROCEDURE — 99213 OFFICE O/P EST LOW 20 MIN: CPT | Performed by: FAMILY MEDICINE

## 2022-01-31 PROCEDURE — 81001 URINALYSIS AUTO W/SCOPE: CPT | Performed by: FAMILY MEDICINE

## 2022-01-31 NOTE — PROGRESS NOTES
Assessment & Plan     Urinary frequency  Unremarkable findings patient advised to return in a few days if symptoms persist return sooner if worsening.   - UA macro with reflex to Microscopic and Culture - Clinc Collect  - Urine Microscopic       Freddy Roque MD   [facility]    Kathryn Blake is a 41 year old male who presents to clinic today for the following health issues:  Chief Complaint   Patient presents with     Urgent Care     UTI     x 4-5 days having frequency, even a couple minutes after urinating; stream is decent in AM, has urge to urinate at night but can fight it off until the morning; notes one day last week had some mild right testible pain and right lower back pain but went away after taking 2 tylenol; no concern for STDs, has one rfemale partner (wife)     HPI    No known discharge.   Of note had a recent irritation of testicle where he may have applied pressure wrong to a testicle but this resolved quite quickly.     He has no vomiting/diarrhea. No recent history of UTI.     No blood in urine. No known hx of diabetes.     The frequency is not limited to the evening.   Feeling of incomplete void  No known hx of diabetes    Patient Active Problem List    Diagnosis Date Noted     Morbid obesity (H) 06/16/2020     Priority: Medium     GERD (gastroesophageal reflux disease) 11/27/2012     Priority: Medium     Obstructive sleep apnea 09/15/2011     Priority: Medium     BiPAP       Central sleep apnea 09/15/2011     Priority: Medium     CARDIOVASCULAR SCREENING; LDL GOAL LESS THAN 160 02/10/2010     Priority: Medium     Fatty liver 05/21/2009     Priority: Medium     Elevated liver function tests 05/21/2009     Priority: Medium     Plantar fasciitis 05/21/2009     Priority: Medium     Epistaxis 06/02/2008     Priority: Medium     Obesity 12/24/2007     Priority: Medium     Problem list name updated by automated process. Provider to review       Hypertrophic and atrophic condition  of skin 12/24/2007     Priority: Medium     Problem list name updated by automated process. Provider to review       Chronic rhinitis 12/24/2007     Priority: Medium       Current Outpatient Medications   Medication     ammonium lactate (AMLACTIN) 12 % external cream     ibuprofen (ADVIL/MOTRIN) 600 MG tablet     No current facility-administered medications for this visit.           Objective    /62 (BP Location: Left arm, Patient Position: Chair, Cuff Size: Adult Large)   Pulse 108   Temp 98.1  F (36.7  C) (Tympanic)   Resp 16   Wt 127.9 kg (282 lb)   SpO2 95%   BMI 38.25 kg/m    Physical Exam     : unremarkable    Results for orders placed or performed in visit on 01/31/22   UA macro with reflex to Microscopic and Culture - Clinc Collect     Status: Abnormal    Specimen: Urine, Midstream   Result Value Ref Range    Color Urine Yellow Colorless, Straw, Light Yellow, Yellow    Appearance Urine Clear Clear    Glucose Urine Negative Negative mg/dL    Bilirubin Urine Negative Negative    Ketones Urine Negative Negative mg/dL    Specific Gravity Urine 1.010 1.003 - 1.035    Blood Urine Small (A) Negative    pH Urine 6.0 5.0 - 7.0    Protein Albumin Urine Negative Negative mg/dL    Urobilinogen Urine 0.2 0.2, 1.0 E.U./dL    Nitrite Urine Negative Negative    Leukocyte Esterase Urine Negative Negative   Urine Microscopic     Status: Abnormal   Result Value Ref Range    RBC Urine 0-2 0-2 /HPF /HPF    WBC Urine 0-5 0-5 /HPF /HPF    Squamous Epithelials Urine Few (A) None Seen /LPF    Narrative    Urine Culture not indicated                     The use of Dragon/Splash[employer] dictation services may have been used to construct the content in this note; any grammatical or spelling errors are non-intentional. Please contact the author of this note directly if you are in need of any clarification.

## 2022-02-16 ENCOUNTER — APPOINTMENT (OUTPATIENT)
Dept: URGENT CARE | Facility: CLINIC | Age: 42
End: 2022-02-16
Payer: COMMERCIAL

## 2022-02-19 ENCOUNTER — HEALTH MAINTENANCE LETTER (OUTPATIENT)
Age: 42
End: 2022-02-19

## 2022-05-14 ENCOUNTER — HOSPITAL ENCOUNTER (EMERGENCY)
Facility: CLINIC | Age: 42
Discharge: HOME | End: 2022-05-14
Attending: EMERGENCY MEDICINE | Admitting: EMERGENCY MEDICINE
Payer: COMMERCIAL

## 2022-05-14 VITALS
WEIGHT: 282 LBS | RESPIRATION RATE: 18 BRPM | HEIGHT: 71 IN | OXYGEN SATURATION: 97 % | HEART RATE: 81 BPM | TEMPERATURE: 97.8 F | DIASTOLIC BLOOD PRESSURE: 104 MMHG | BODY MASS INDEX: 39.48 KG/M2 | SYSTOLIC BLOOD PRESSURE: 146 MMHG

## 2022-05-14 DIAGNOSIS — M54.50 ACUTE RIGHT-SIDED LOW BACK PAIN WITHOUT SCIATICA: ICD-10-CM

## 2022-05-14 PROCEDURE — 99283 EMERGENCY DEPT VISIT LOW MDM: CPT

## 2022-05-14 PROCEDURE — 250N000013 HC RX MED GY IP 250 OP 250 PS 637: Performed by: EMERGENCY MEDICINE

## 2022-05-14 RX ORDER — CYCLOBENZAPRINE HCL 10 MG
10 TABLET ORAL 3 TIMES DAILY PRN
Qty: 18 TABLET | Refills: 0 | Status: SHIPPED | OUTPATIENT
Start: 2022-05-14 | End: 2022-05-20

## 2022-05-14 RX ORDER — IBUPROFEN 600 MG/1
600 TABLET, FILM COATED ORAL ONCE
Status: COMPLETED | OUTPATIENT
Start: 2022-05-14 | End: 2022-05-14

## 2022-05-14 RX ADMIN — IBUPROFEN 600 MG: 600 TABLET ORAL at 16:42

## 2022-05-14 ASSESSMENT — ENCOUNTER SYMPTOMS
FEVER: 0
BACK PAIN: 1

## 2022-05-14 NOTE — ED PROVIDER NOTES
History   Chief Complaint:  Back Pain       The history is provided by the patient.      Hayden Nelson is a 41 year old male with history of chronic back pain who presents with lower right sided-back pain. For the past 4-5 months Hayden has been having intermittent back pain that had resolved with ibuprofen. He says that historically speaking, twisting or bending in certain positions would cause him pain. Usually the pain would resolve on its own with ibuprofen. Additionally, he claims to having been put on several antibiotics for the past few months with having several dental surgeries performed. Since then he has lost approximately 30 pounds due to decreased oral intake.    Today Hayden was putting on his left sock when he again tweaked his lower lumbar back. The pain lasted for a little bit, but resolved by the time he had gotten to his job. 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 He immediately visited the ED due to significant worsening of pain with any movement. Here at the ED Hayden is comfortable on the gurney so long as he does not move. He denies any recent issues with urination, weakness, or other affiliated pain. The pain does not radiate down into the legs. No genitourinary issues. No fever.     Review of Systems   Constitutional: Negative for fever.   Genitourinary: Negative.    Musculoskeletal: Positive for back pain.   All other systems reviewed and are negative.    Allergies:  Carafate  Omeprazole Magnesium    Medications:  Ibuprofen     Past Medical History:     Viral warts  Chronic back pain  Obesity  Hypertrophic and atrophic condition of skin  Chronic rhinitis  Epistaxis  Fatty liver  Elevated liver function tests  Plantar fasciitis  Obstructive sleep apnea  Central sleep apnea  GERD  Morbid obesity     Past Surgical History:    Dental surgery with  "subsequent denture placement  Repair of nasal septum    Family History:    Father-brain and lung cancer  Maternal Grandmother-Diabetes type I  Paternal Aunt-Celiac disease    Social History:  Patient unaccompanied  PCP: Artemio Deleon     Physical Exam     Patient Vitals for the past 24 hrs:   BP Temp Pulse Resp SpO2 Height Weight   05/14/22 1645 (!) 146/104 -- 81 18 97 % -- --   05/14/22 1318 -- 97.8  F (36.6  C) 116 16 95 % 1.803 m (5' 11\") 127.9 kg (282 lb)       Physical Exam  Gen:  Pleasant, appears stated age.    Eye:   Sclera non-injected.      Musculoskeletal:     Severe pain and stiffness when attempting to sit upright in the bed.  Points to his right paraspinous lumbar spine, approximately L3-L4 as location of the pain.  Palpable muscle spasm there.    Extremities:    No edema.  Atraumatic.      Skin:   Warm and dry.  No rash to the back.    Neurologic:    5/5 strength in hip flexors, knee extensors, dorsiflexion, plantarflexion, EHL, FHL.   Fine touch sensation intact throughout bilateral lower extremities.    Psychiatric:     Patient agitated when we discussed care plan.        Emergency Department Course     Emergency Department Course:    Reviewed:  I reviewed nursing notes, vitals, past medical history and Care Everywhere    Assessments/Consults:  ED Course as of 05/15/22 0018   Sat May 14, 2022   1614 Obtained history and examined the patient as noted above.    1622 Discussed with patient my plan to have him follow-up with PCP for further imaging, given that I do not think he has any symptoms that would suggest need for immediate surgical decompression, such as cauda equina syndrome, epidural abscess, osteomyelitis, or discitis.  I discussed that I would recommend a treatment approach including a combination of medication such as ibuprofen, lidocaine patch, and muscle relaxant such as cyclobenzaprine.  He became very angry a, stating that these are things that he could have done on his own, and that " he was here for an MRI.  He stated that he just wanted to go home, did not want any additional evaluation.  I attempted to discuss my rationale further, but he continually interrupted me.  I was able to complete history and physical exam, and think that he is appropriate for discharge.      Interventions:  1642 Ibuprofen 600 mg, Oral    Disposition:  The patient was discharged to home.     Impression & Plan     Medical Decision Making:  Hayden Nelson is a 41 year old male who presented with back pain.  The patient has vitals demonstrating hypertension, but no fever.  He is not anticoagulated, no history of trauma, or malignancy.  He is not immunosuppressed.  No IVDU.  The patient has not had saddle/perineal anesthesia, bilateral foot numbness, or bowel or bladder dysfunction.  There is no clinical evidence of cauda equina syndrome, discitis, spinal/epidural space hematoma or abscess.  Symptoms are not consistent with pyelonephritis, kidney stone, or aortic dissection or abdominal aortic aneurysm.    The neurological exam is normal and the patient's symptoms are consistent with a musculoskeletal strain. There is no current evidence of radiculopathy or myelopathy.   The patient became very irritated when I discussed that I did not recommend performing an MRI, given that I did not think he had any of the emergent conditions described above.  Please see the above note for details of the discussion.    The patient was told to return for:  increasing pain, numbness, weakness, or bowel or bladder dysfunction.  The patient was advised to schedule follow-up with his primary doctor within 3 days to re-assess symptoms.      Diagnosis:    ICD-10-CM    1. Acute right-sided low back pain without sciatica  M54.50        Discharge Medications:  Discharge Medication List as of 5/14/2022  4:33 PM      START taking these medications    Details   cyclobenzaprine (FLEXERIL) 10 MG tablet Take 1 tablet (10 mg) by mouth 3 times daily  as needed for muscle spasms, Disp-18 tablet, R-0, Local Print             Scribe Disclosure:  I, Sotero Obando, am serving as a scribe at 4:00 PM on 5/14/2022 to document services personally performed by Flaquita Butts MD based on my observations and the provider's statements to me.             Flaquita Butts MD  05/15/22 0019

## 2022-05-14 NOTE — ED TRIAGE NOTES
Pt presents for complaint of right low back pain that started today. Pt states he was at his job site walking and felt a pop. States painful with any movement since. Hx of previous back issues. Denies direct injury. ABC intact, A&Ox4.     Triage Assessment     Row Name 05/14/22 0798       Triage Assessment (Adult)    Airway WDL WDL       Respiratory WDL    Respiratory WDL WDL       Skin Circulation/Temperature WDL    Skin Circulation/Temperature WDL WDL       Cardiac WDL    Cardiac WDL WDL       Peripheral/Neurovascular WDL    Peripheral Neurovascular WDL WDL       Cognitive/Neuro/Behavioral WDL    Cognitive/Neuro/Behavioral WDL WDL

## 2022-05-18 ENCOUNTER — OFFICE VISIT (OUTPATIENT)
Dept: PEDIATRICS | Facility: CLINIC | Age: 42
End: 2022-05-18
Payer: COMMERCIAL

## 2022-05-18 VITALS
HEIGHT: 71 IN | DIASTOLIC BLOOD PRESSURE: 76 MMHG | SYSTOLIC BLOOD PRESSURE: 128 MMHG | BODY MASS INDEX: 39.83 KG/M2 | WEIGHT: 284.5 LBS | HEART RATE: 107 BPM | RESPIRATION RATE: 12 BRPM | TEMPERATURE: 98.1 F | OXYGEN SATURATION: 98 %

## 2022-05-18 DIAGNOSIS — M54.50 ACUTE RIGHT-SIDED LOW BACK PAIN WITHOUT SCIATICA: ICD-10-CM

## 2022-05-18 DIAGNOSIS — Z13.220 SCREENING FOR HYPERLIPIDEMIA: ICD-10-CM

## 2022-05-18 DIAGNOSIS — G47.33 OBSTRUCTIVE SLEEP APNEA: ICD-10-CM

## 2022-05-18 DIAGNOSIS — Z00.00 ROUTINE GENERAL MEDICAL EXAMINATION AT A HEALTH CARE FACILITY: Primary | ICD-10-CM

## 2022-05-18 DIAGNOSIS — E66.01 MORBID OBESITY (H): ICD-10-CM

## 2022-05-18 DIAGNOSIS — K76.0 FATTY LIVER: ICD-10-CM

## 2022-05-18 DIAGNOSIS — Z71.6 ENCOUNTER FOR SMOKING CESSATION COUNSELING: ICD-10-CM

## 2022-05-18 LAB
ERYTHROCYTE [DISTWIDTH] IN BLOOD BY AUTOMATED COUNT: 13.1 % (ref 10–15)
HBA1C MFR BLD: 5.5 % (ref 0–5.6)
HCT VFR BLD AUTO: 46.5 % (ref 40–53)
HGB BLD-MCNC: 15.6 G/DL (ref 13.3–17.7)
MCH RBC QN AUTO: 30.5 PG (ref 26.5–33)
MCHC RBC AUTO-ENTMCNC: 33.5 G/DL (ref 31.5–36.5)
MCV RBC AUTO: 91 FL (ref 78–100)
PLATELET # BLD AUTO: 224 10E3/UL (ref 150–450)
RBC # BLD AUTO: 5.12 10E6/UL (ref 4.4–5.9)
WBC # BLD AUTO: 8.2 10E3/UL (ref 4–11)

## 2022-05-18 PROCEDURE — 99213 OFFICE O/P EST LOW 20 MIN: CPT | Mod: 25 | Performed by: STUDENT IN AN ORGANIZED HEALTH CARE EDUCATION/TRAINING PROGRAM

## 2022-05-18 PROCEDURE — 80061 LIPID PANEL: CPT | Performed by: STUDENT IN AN ORGANIZED HEALTH CARE EDUCATION/TRAINING PROGRAM

## 2022-05-18 PROCEDURE — 80053 COMPREHEN METABOLIC PANEL: CPT | Performed by: STUDENT IN AN ORGANIZED HEALTH CARE EDUCATION/TRAINING PROGRAM

## 2022-05-18 PROCEDURE — 83036 HEMOGLOBIN GLYCOSYLATED A1C: CPT | Performed by: STUDENT IN AN ORGANIZED HEALTH CARE EDUCATION/TRAINING PROGRAM

## 2022-05-18 PROCEDURE — 99396 PREV VISIT EST AGE 40-64: CPT | Mod: GC | Performed by: STUDENT IN AN ORGANIZED HEALTH CARE EDUCATION/TRAINING PROGRAM

## 2022-05-18 PROCEDURE — 36415 COLL VENOUS BLD VENIPUNCTURE: CPT | Performed by: STUDENT IN AN ORGANIZED HEALTH CARE EDUCATION/TRAINING PROGRAM

## 2022-05-18 PROCEDURE — 85027 COMPLETE CBC AUTOMATED: CPT | Performed by: STUDENT IN AN ORGANIZED HEALTH CARE EDUCATION/TRAINING PROGRAM

## 2022-05-18 PROCEDURE — 90732 PPSV23 VACC 2 YRS+ SUBQ/IM: CPT | Performed by: STUDENT IN AN ORGANIZED HEALTH CARE EDUCATION/TRAINING PROGRAM

## 2022-05-18 PROCEDURE — 90471 IMMUNIZATION ADMIN: CPT | Performed by: STUDENT IN AN ORGANIZED HEALTH CARE EDUCATION/TRAINING PROGRAM

## 2022-05-18 SDOH — ECONOMIC STABILITY: TRANSPORTATION INSECURITY
IN THE PAST 12 MONTHS, HAS THE LACK OF TRANSPORTATION KEPT YOU FROM MEDICAL APPOINTMENTS OR FROM GETTING MEDICATIONS?: NO

## 2022-05-18 SDOH — ECONOMIC STABILITY: FOOD INSECURITY: WITHIN THE PAST 12 MONTHS, YOU WORRIED THAT YOUR FOOD WOULD RUN OUT BEFORE YOU GOT MONEY TO BUY MORE.: NEVER TRUE

## 2022-05-18 SDOH — ECONOMIC STABILITY: FOOD INSECURITY: WITHIN THE PAST 12 MONTHS, THE FOOD YOU BOUGHT JUST DIDN'T LAST AND YOU DIDN'T HAVE MONEY TO GET MORE.: NEVER TRUE

## 2022-05-18 SDOH — HEALTH STABILITY: PHYSICAL HEALTH: ON AVERAGE, HOW MANY DAYS PER WEEK DO YOU ENGAGE IN MODERATE TO STRENUOUS EXERCISE (LIKE A BRISK WALK)?: 7 DAYS

## 2022-05-18 SDOH — HEALTH STABILITY: PHYSICAL HEALTH: ON AVERAGE, HOW MANY MINUTES DO YOU ENGAGE IN EXERCISE AT THIS LEVEL?: 20 MIN

## 2022-05-18 SDOH — ECONOMIC STABILITY: TRANSPORTATION INSECURITY
IN THE PAST 12 MONTHS, HAS LACK OF TRANSPORTATION KEPT YOU FROM MEETINGS, WORK, OR FROM GETTING THINGS NEEDED FOR DAILY LIVING?: NO

## 2022-05-18 SDOH — ECONOMIC STABILITY: INCOME INSECURITY: IN THE LAST 12 MONTHS, WAS THERE A TIME WHEN YOU WERE NOT ABLE TO PAY THE MORTGAGE OR RENT ON TIME?: NO

## 2022-05-18 SDOH — ECONOMIC STABILITY: INCOME INSECURITY: HOW HARD IS IT FOR YOU TO PAY FOR THE VERY BASICS LIKE FOOD, HOUSING, MEDICAL CARE, AND HEATING?: NOT VERY HARD

## 2022-05-18 ASSESSMENT — ENCOUNTER SYMPTOMS
COUGH: 0
ABDOMINAL PAIN: 0
FEVER: 0
DIARRHEA: 0
ARTHRALGIAS: 0
PARESTHESIAS: 0
DYSURIA: 0
HEMATURIA: 0
MYALGIAS: 1
PALPITATIONS: 0
JOINT SWELLING: 0
DIZZINESS: 0
CHILLS: 0
NAUSEA: 0
CONSTIPATION: 0
HEARTBURN: 0
HEMATOCHEZIA: 0
SHORTNESS OF BREATH: 0
WEAKNESS: 0
FREQUENCY: 0
SORE THROAT: 0
NERVOUS/ANXIOUS: 0
EYE PAIN: 0
HEADACHES: 0

## 2022-05-18 ASSESSMENT — SOCIAL DETERMINANTS OF HEALTH (SDOH)
IN A TYPICAL WEEK, HOW MANY TIMES DO YOU TALK ON THE PHONE WITH FAMILY, FRIENDS, OR NEIGHBORS?: MORE THAN THREE TIMES A WEEK
DO YOU BELONG TO ANY CLUBS OR ORGANIZATIONS SUCH AS CHURCH GROUPS UNIONS, FRATERNAL OR ATHLETIC GROUPS, OR SCHOOL GROUPS?: NO
HOW OFTEN DO YOU ATTEND CHURCH OR RELIGIOUS SERVICES?: NEVER
HOW OFTEN DO YOU GET TOGETHER WITH FRIENDS OR RELATIVES?: THREE TIMES A WEEK

## 2022-05-18 ASSESSMENT — PAIN SCALES - GENERAL: PAINLEVEL: MODERATE PAIN (4)

## 2022-05-18 ASSESSMENT — LIFESTYLE VARIABLES
AUDIT-C TOTAL SCORE: 1
SKIP TO QUESTIONS 9-10: 1
HOW MANY STANDARD DRINKS CONTAINING ALCOHOL DO YOU HAVE ON A TYPICAL DAY: 1 OR 2
HOW OFTEN DO YOU HAVE SIX OR MORE DRINKS ON ONE OCCASION: NEVER
HOW OFTEN DO YOU HAVE A DRINK CONTAINING ALCOHOL: MONTHLY OR LESS

## 2022-05-18 NOTE — PROGRESS NOTES
"SUBJECTIVE:   CC: Hayden Nelson is an 41 year old male who presents for preventative health visit.     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 4 days ago the patient was bending down to do something for work when he fell to \"crack\" in his back. He went to the emergency department and was evaluated there. They did not feel that he needed any imaging and recommended conservative management. Patient reports that his symptoms have significantly improved since then, however he still has about 50% of the pain that he is experiencing particularly in the right lower back. He denies any numbness, tingling, focal weakness, reports that his sensation is intact.    Stye: Patient reports that since Friday he has had a stye on his left upper eyelid. He has been intermittently doing warm compresses.    Quitting smoking: Patient has had a lot dental surgeries and implants due to his ongoing dental issues. This is exacerbated by his smoking, and his dental surgeon has continued to recommend him to quit smoking. The dental surgeon recommends against use of nicotine lozenges or gum due to focus on impaired wound healing. Patient reports that he wants to do Chantix.    AVERY: Patient states that he wears BiPAP at night. Overall this is stable.    Today's PHQ-2 Score:   PHQ-2 ( 1999 [employer]) 5/18/2022   Q1: " Little interest or pleasure in doing things 0   Q2: Feeling down, depressed or hopeless 0   PHQ-2 Score 0   PHQ-2 Total Score (12-17 Years)- Positive if 3 or more points; Administer PHQ-A if positive -   Q1: Little interest or pleasure in doing things Not at all   Q2: Feeling down, depressed or hopeless Not at all   PHQ-2 Score 0     Abuse: Current or Past(Physical, Sexual or Emotional)- No  Do you feel safe in your environment? Yes    Have you ever done Advance Care Planning? (For example, a Health Directive, POLST, or a discussion with a medical provider or your loved ones about your wishes): No, advance care planning information given to patient to review.  Patient declined advance care planning discussion at this time.    Social History     Tobacco Use     Smoking status: Current Every Day Smoker     Packs/day: 2.00     Years: 14.00     Pack years: 28.00     Types: Cigarettes     Smokeless tobacco: Never Used   Substance Use Topics     Alcohol use: Yes     Comment: 2 beer per year     Alcohol Use 5/18/2022   Prescreen: >3 drinks/day or >7 drinks/week? No   Prescreen: >3 drinks/day or >7 drinks/week? -     Last PSA: No results found for: PSA    PMH: Back pain  MEds: Flexeril PRN since ER visit for low back pain  FH: Dad had lung cancers, GF prostate cancer in his 60s  SH: Lives at home with wife    Reviewed orders with patient. Reviewed health maintenance and updated orders accordingly - Yes    Reviewed and updated as needed this visit by clinical staff   Tobacco  Allergies    Med Hx  Surg Hx  Fam Hx         Temitope Garcia on 5/18/2022 at 10:06 AM    Reviewed and updated as needed this visit by Provider                     Review of Systems   Constitutional: Negative for chills and fever.   HENT: Negative for congestion, ear pain, hearing loss and sore throat.    Eyes: Negative for pain and visual disturbance.   Respiratory: Negative for cough and shortness of breath.    Cardiovascular: Negative for chest pain,  "palpitations and peripheral edema.   Gastrointestinal: Negative for abdominal pain, constipation, diarrhea, heartburn, hematochezia and nausea.   Genitourinary: Negative for dysuria, frequency, genital sores, hematuria, impotence, penile discharge and urgency.   Musculoskeletal: Positive for myalgias. Negative for arthralgias and joint swelling.   Skin: Negative for rash.   Neurological: Negative for dizziness, weakness, headaches and paresthesias.   Psychiatric/Behavioral: Negative for mood changes. The patient is not nervous/anxious.      OBJECTIVE:   /76 (BP Location: Right arm, Patient Position: Sitting, Cuff Size: Adult Large)   Pulse 107   Temp 98.1  F (36.7  C) (Tympanic)   Resp 12   Ht 1.803 m (5' 11\")   Wt 129 kg (284 lb 8 oz)   SpO2 98%   BMI 39.68 kg/m       Physical Exam  GENERAL: healthy, alert and no distress  EYES: Eyes grossly normal to inspection, PERRL and conjunctivae and sclerae normal  HENT: ear canals and TM's normal, nose and mouth without ulcers or lesions  NECK: no adenopathy, no asymmetry, masses, or scars and thyroid normal to palpation  RESP: lungs clear to auscultation - no rales, rhonchi or wheezes  CV: regular rate and rhythm, normal S1 S2, no S3 or S4, no murmur, click or rub, no peripheral edema and peripheral pulses strong  ABDOMEN: soft, nontender, no hepatosplenomegaly, no masses and bowel sounds normal  MS: no gross musculoskeletal defects noted, no edema  SKIN: no suspicious lesions or rashes  NEURO: Normal strength and tone, mentation intact and speech normal  PSYCH: mentation appears normal, affect normal/bright    Diagnostic Test Results:  Labs reviewed in Epic    ASSESSMENT/PLAN:   Hayden was seen today for physical.    Diagnoses and all orders for this visit:    Routine general medical examination at a health care facility  -     Hemoglobin A1c; Future  -     CBC with platelets; Future  -     Hemoglobin A1c  -     Comprehensive metabolic panel (BMP + Alb, Alk " "Phos, ALT, AST, Total. Bili, TP)  -     CBC with platelets    BMI > 35 with Comorbidity of GERD (H)  Obstructive sleep apnea       -     Continue BiPAP at night    Fatty liver        -     Comprehensive metabolic panel (BMP + Alb, Alk Phos, ALT, AST, Total. Bili, TP); Future    Screening for hyperlipidemia  -     Lipid panel reflex to direct LDL Fasting; Future  -     Lipid panel reflex to direct LDL Fasting    Encounter for smoking cessation counseling  -     varenicline (CHANTIX DONOVAN) 0.5 MG X 11 & 1 MG X 42 tablet; Take 0.5 mg tab daily for 3 days, THEN 0.5 mg tab twice daily for 4 days, THEN 1 mg twice daily.    Acute right-sided low back pain without sciatica  -     Physical Therapy Referral; Future    Other orders  -     Pneumococcal vaccine 23 valent PPSV23  (Pneumovax) [[id number]]    COUNSELING:   Reviewed preventive health counseling, as reflected in patient instructions       Regular exercise       Healthy diet/nutrition       Colorectal cancer screening       Prostate cancer screening       One time pneumovax for smokers    Estimated body mass index is 39.68 kg/m  as calculated from the following:    Height as of this encounter: 1.803 m (5' 11\").    Weight as of this encounter: 129 kg (284 lb 8 oz).     Weight management plan: Discussed healthy diet and exercise guidelines    He reports that he has been smoking cigarettes. He has a 28.00 pack-year smoking history. He has never used smokeless tobacco.  Tobacco Cessation Action Plan:   Pharmacotherapies : Chantix      Counseling Resources:  ATP IV Guidelines  Pooled Cohorts Equation Calculator  FRAX Risk Assessment  ICSI Preventive Guidelines  Dietary Guidelines for Americans, 2010  [employer]'s MyPlate  ASA Prophylaxis  Lung CA Screening    Joe Faulkner MD  [facility]    Physician Attestation   I, Rex Siegel MD, saw this patient and agree with the findings and plan of care as documented in the note.      Items personally " reviewed/procedural attestation: vitals and labs.    Rex Siegel MD

## 2022-05-18 NOTE — PATIENT INSTRUCTIONS
Chantix  Start chantix one week before your planned quit date.     We will slowly increase the dose of the chantix to avoid side effects (mostly GI side effects like nausea). Taking chantix with food and a full glass of water can also help with any nausea.    Some people notice more vivid dreams - if this bothers you you can try taking the evening dose a bit earlier (later afternoon). Or call me and we can talk about other adjustments.     If you have any changes in behavior, agitation, depression, suicidal thoughts, etc please stop chantix and call us immediately.    Most people stay on the medication for 12 weeks and quit within this time. Let us know when you need a refill.    Some people who have fully quit like to continue for an additional 12 weeks - please give my office a call if this is something you're interested in.     Make sure to keep doing the other things that help you quit - avoiding triggers/situations which encourage smoking, having a plan for the times you want to smoke, etc.      Preventive Health Recommendations  Male Ages 40 to 49    Yearly exam:             See your health care provider every year in order to  o   Review health changes.   o   Discuss preventive care.    o   Review your medicines if your doctor has prescribed any.  You should be tested each year for STDs (sexually transmitted diseases) if you re at risk.   Have a cholesterol test every 5 years.   Have a colonoscopy (test for colon cancer) if someone in your family has had colon cancer or polyps before age 50.   After age 45, have a diabetes test (fasting glucose). If you are at risk for diabetes, you should have this test every 3 years.    Talk with your health care provider about whether or not a prostate cancer screening test (PSA) is right for you.    Shots: Get a flu shot each year. Get a tetanus shot every 10 years.     Nutrition:  Eat at least 5 servings of fruits and vegetables daily.   Eat whole-grain bread,  whole-wheat pasta and brown rice instead of white grains and rice.   Get adequate Calcium and Vitamin D.     Lifestyle  Exercise for at least 150 minutes a week (30 minutes a day, 5 days a week). This will help you control your weight and prevent disease.   Limit alcohol to one drink per day.   No smoking.   Wear sunscreen to prevent skin cancer.   See your dentist every six months for an exam and cleaning.

## 2022-05-19 ENCOUNTER — TELEPHONE (OUTPATIENT)
Dept: PEDIATRICS | Facility: CLINIC | Age: 42
End: 2022-05-19
Payer: COMMERCIAL

## 2022-05-19 LAB
ALBUMIN SERPL-MCNC: 4.1 G/DL (ref 3.4–5)
ALP SERPL-CCNC: 61 U/L (ref 40–150)
ALT SERPL W P-5'-P-CCNC: 23 U/L (ref 0–70)
ANION GAP SERPL CALCULATED.3IONS-SCNC: 5 MMOL/L (ref 3–14)
AST SERPL W P-5'-P-CCNC: 11 U/L (ref 0–45)
BILIRUB SERPL-MCNC: 0.4 MG/DL (ref 0.2–1.3)
BUN SERPL-MCNC: 12 MG/DL (ref 7–30)
CALCIUM SERPL-MCNC: 9.9 MG/DL (ref 8.5–10.1)
CHLORIDE BLD-SCNC: 110 MMOL/L (ref 94–109)
CHOLEST SERPL-MCNC: 210 MG/DL
CO2 SERPL-SCNC: 26 MMOL/L (ref 20–32)
CREAT SERPL-MCNC: 0.9 MG/DL (ref 0.66–1.25)
FASTING STATUS PATIENT QL REPORTED: YES
GFR SERPL CREATININE-BSD FRML MDRD: >90 ML/MIN/1.73M2
GLUCOSE BLD-MCNC: 85 MG/DL (ref 70–99)
HDLC SERPL-MCNC: 34 MG/DL
LDLC SERPL CALC-MCNC: 140 MG/DL
NONHDLC SERPL-MCNC: 176 MG/DL
POTASSIUM BLD-SCNC: 4.3 MMOL/L (ref 3.4–5.3)
PROT SERPL-MCNC: 7.5 G/DL (ref 6.8–8.8)
SODIUM SERPL-SCNC: 141 MMOL/L (ref 133–144)
TRIGL SERPL-MCNC: 179 MG/DL

## 2022-05-19 NOTE — TELEPHONE ENCOUNTER
Fax from pharmacy states that Chantix is not covered by insurance and they need an alternative.     Routing to preceptor     Lucía Hoskins CMA

## 2022-05-22 NOTE — TELEPHONE ENCOUNTER
Can we confirm that it's not covered (vs not currently in stock? There was a time we had to order the Menard version).     If not covered what is on their formulary for smoking cessation?    Please update pt that we are working on this. Good Rx shows [facility] would be $190 for #56 of the 1mg tabs.     SHERRY Siegel MD  Internal Medicine-Pediatrics

## 2022-05-24 NOTE — TELEPHONE ENCOUNTER
1st attempt l/m for the pharmacy to call back so that we can ask them Dr. Siegel's questions below.   Kaur Gomez on 5/24/2022 at 7:31 AM

## 2022-10-22 ENCOUNTER — HEALTH MAINTENANCE LETTER (OUTPATIENT)
Age: 42
End: 2022-10-22

## 2023-01-18 ENCOUNTER — TRANSFERRED RECORDS (OUTPATIENT)
Dept: HEALTH INFORMATION MANAGEMENT | Facility: CLINIC | Age: 43
End: 2023-01-18

## 2023-04-20 ENCOUNTER — PATIENT OUTREACH (OUTPATIENT)
Dept: CARE COORDINATION | Facility: CLINIC | Age: 43
End: 2023-04-20
Payer: COMMERCIAL

## 2023-05-04 ENCOUNTER — PATIENT OUTREACH (OUTPATIENT)
Dept: CARE COORDINATION | Facility: CLINIC | Age: 43
End: 2023-05-04
Payer: COMMERCIAL

## 2023-06-18 ENCOUNTER — HEALTH MAINTENANCE LETTER (OUTPATIENT)
Age: 43
End: 2023-06-18

## 2024-07-10 ENCOUNTER — TRANSFERRED RECORDS (OUTPATIENT)
Dept: HEALTH INFORMATION MANAGEMENT | Facility: CLINIC | Age: 44
End: 2024-07-10
Payer: COMMERCIAL

## 2024-08-11 ENCOUNTER — HEALTH MAINTENANCE LETTER (OUTPATIENT)
Age: 44
End: 2024-08-11